# Patient Record
Sex: FEMALE | Race: WHITE | Employment: UNEMPLOYED | URBAN - METROPOLITAN AREA
[De-identification: names, ages, dates, MRNs, and addresses within clinical notes are randomized per-mention and may not be internally consistent; named-entity substitution may affect disease eponyms.]

---

## 2019-10-15 NOTE — LETTER
[facility]  Administration of Anesthesia  I, Carisa Khan agree to be cared for by a physician anesthesiologist alone and/or with a nurse anesthetist, who is specially trained to monitor me and give me medicine to put me to sleep or keep me comfortable during my procedure    I understand that my anesthesiologist and/or anesthetist is not an employee or agent of [facility] or [employer]. He or she works for [facility] [employer]    As the patient asking for anesthesia services, I agree to:  Allow the anesthesiologist (anesthesia doctor) to give me medicine and do additional procedures as necessary. Some examples are: Starting or using an “IV” to give me medicine, fluids or blood during my procedure, and having a breathing tube placed to help me breathe when I’m asleep (intubation). In the event that my heart stops working properly, I understand that my anesthesiologist will make every effort to sustain my life, unless otherwise directed by [facility] Do Not Resuscitate documents.  Tell my anesthesia doctor before my procedure:  If I am pregnant.  The last time that I ate or drank.  iii. All of the medicines I take (including prescriptions, herbal supplements, and pills I can buy without a prescription (including street drugs/illegal medications). Failure to inform my anesthesiologist about these medicines may increase my risk of anesthetic complications.  iv.If I am allergic to anything or have had a reaction to anesthesia before.  I understand how the anesthesia medicine will help me (benefits).  I understand that with any type of anesthesia medicine there are risks:  The most common risks are: nausea, vomiting, sore throat, muscle soreness, damage to my eyes, mouth, or teeth (from breathing tube placement).  Rare risks include: remembering what happened during my procedure, allergic reactions to medications, injury to my airway, heart, lungs, vision, nerves, or  muscles and in extremely rare instances death.  My doctor has explained to me other choices available to me for my care (alternatives).  Pregnant Patients (“epidural”):  I understand that the risks of having an epidural (medicine given into my back to help control pain during labor), include itching, low blood pressure, difficulty urinating, headache or slowing of the baby’s heart. Very rare risks include infection, bleeding, seizure, irregular heart rhythms and nerve injury.  Regional Anesthesia (“spinal”, “epidural”, & “nerve blocks”):  I understand that rare but potential complications include headache, bleeding, infection, seizure, irregular heart rhythms, and nerve injury.    _____________________________________________________________________________  Patient (or Representative) Signature/Relationship to Patient  Date   Time    _____________________________________________________________________________   Name (if used)    Language/Organization   Time    _____________________________________________________________________________  Nurse Anesthetist Signature     Date   Time  _____________________________________________________________________________  Anesthesiologist Signature     Date   Time  I have discussed the procedure and information above with the patient (or patient’s representative) and answered their questions. The patient or their representative has agreed to have anesthesia services.    _____________________________________________________________________________  Witness        Date   Time  I have verified that the signature is that of the patient or patient’s representative, and that it was signed before the procedure  Patient Name: Carisa Khan     : 1995                 Printed: 2024 at 7:21 AM    Medical Record #: [id number]                                            Page 1 of 1  ----------ANESTHESIA CONSENT----------     [___] : Multiple Births: [unfilled]

## 2024-06-15 ENCOUNTER — HOSPITAL ENCOUNTER (EMERGENCY)
Facility: HOSPITAL | Age: 29
Discharge: HOME | End: 2024-06-16

## 2024-06-15 DIAGNOSIS — K62.5 BRBPR (BRIGHT RED BLOOD PER RECTUM): ICD-10-CM

## 2024-06-15 DIAGNOSIS — S30.1XXA CONTUSION OF ABDOMINAL WALL, INITIAL ENCOUNTER: Primary | ICD-10-CM

## 2024-06-15 LAB
ALBUMIN SERPL-MCNC: 4.7 G/DL (ref 3.2–4.8)
ALBUMIN/GLOB SERPL: 1.4 {RATIO} (ref 1–2)
ALP LIVER SERPL-CCNC: 98 U/L
ALT SERPL-CCNC: 134 U/L
ANION GAP SERPL CALC-SCNC: 10 MMOL/L (ref 0–18)
AST SERPL-CCNC: 65 U/L (ref ?–34)
BASOPHILS # BLD AUTO: 0.05 X10(3) UL (ref 0–0.2)
BASOPHILS NFR BLD AUTO: 0.5 %
BILIRUB SERPL-MCNC: 0.3 MG/DL (ref 0.3–1.2)
BUN BLD-MCNC: 12 MG/DL (ref 9–23)
BUN/CREAT SERPL: 14 (ref 10–20)
CALCIUM BLD-MCNC: 9.7 MG/DL (ref 8.7–10.4)
CHLORIDE SERPL-SCNC: 108 MMOL/L (ref 98–112)
CO2 SERPL-SCNC: 23 MMOL/L (ref 21–32)
CREAT BLD-MCNC: 0.86 MG/DL
DEPRECATED RDW RBC AUTO: 42.9 FL (ref 35.1–46.3)
EGFRCR SERPLBLD CKD-EPI 2021: 94 ML/MIN/1.73M2 (ref 60–?)
EOSINOPHIL # BLD AUTO: 0.41 X10(3) UL (ref 0–0.7)
EOSINOPHIL NFR BLD AUTO: 3.7 %
ERYTHROCYTE [DISTWIDTH] IN BLOOD BY AUTOMATED COUNT: 12.8 % (ref 11–15)
GLOBULIN PLAS-MCNC: 3.3 G/DL (ref 2–3.5)
GLUCOSE BLD-MCNC: 121 MG/DL (ref 70–99)
HCT VFR BLD AUTO: 41.5 %
HGB BLD-MCNC: 14.2 G/DL
IMM GRANULOCYTES # BLD AUTO: 0.05 X10(3) UL (ref 0–1)
IMM GRANULOCYTES NFR BLD: 0.5 %
LYMPHOCYTES # BLD AUTO: 3.47 X10(3) UL (ref 1–4)
LYMPHOCYTES NFR BLD AUTO: 31.5 %
MCH RBC QN AUTO: 31.4 PG (ref 26–34)
MCHC RBC AUTO-ENTMCNC: 34.2 G/DL (ref 31–37)
MCV RBC AUTO: 91.8 FL
MONOCYTES # BLD AUTO: 0.59 X10(3) UL (ref 0.1–1)
MONOCYTES NFR BLD AUTO: 5.3 %
NEUTROPHILS # BLD AUTO: 6.46 X10 (3) UL (ref 1.5–7.7)
NEUTROPHILS # BLD AUTO: 6.46 X10(3) UL (ref 1.5–7.7)
NEUTROPHILS NFR BLD AUTO: 58.5 %
OSMOLALITY SERPL CALC.SUM OF ELEC: 293 MOSM/KG (ref 275–295)
PLATELET # BLD AUTO: 394 10(3)UL (ref 150–450)
POTASSIUM SERPL-SCNC: 3.9 MMOL/L (ref 3.5–5.1)
PROT SERPL-MCNC: 8 G/DL (ref 5.7–8.2)
RBC # BLD AUTO: 4.52 X10(6)UL
SODIUM SERPL-SCNC: 141 MMOL/L (ref 136–145)
WBC # BLD AUTO: 11 X10(3) UL (ref 4–11)

## 2024-06-15 PROCEDURE — 85025 COMPLETE CBC W/AUTO DIFF WBC: CPT

## 2024-06-15 PROCEDURE — 99284 EMERGENCY DEPT VISIT MOD MDM: CPT

## 2024-06-15 PROCEDURE — 85610 PROTHROMBIN TIME: CPT | Performed by: NURSE PRACTITIONER

## 2024-06-15 PROCEDURE — 80053 COMPREHEN METABOLIC PANEL: CPT

## 2024-06-15 PROCEDURE — 36415 COLL VENOUS BLD VENIPUNCTURE: CPT

## 2024-06-16 ENCOUNTER — APPOINTMENT (OUTPATIENT)
Dept: CT IMAGING | Facility: HOSPITAL | Age: 29
End: 2024-06-16
Attending: NURSE PRACTITIONER

## 2024-06-16 VITALS
OXYGEN SATURATION: 96 % | RESPIRATION RATE: 18 BRPM | WEIGHT: 148 LBS | DIASTOLIC BLOOD PRESSURE: 79 MMHG | SYSTOLIC BLOOD PRESSURE: 139 MMHG | HEIGHT: 64 IN | TEMPERATURE: 98 F | BODY MASS INDEX: 25.27 KG/M2 | HEART RATE: 76 BPM

## 2024-06-16 LAB
B-HCG UR QL: NEGATIVE
B-HCG UR QL: NEGATIVE
INR BLD: 0.93 (ref 0.8–1.2)
PROTHROMBIN TIME: 13 SECONDS (ref 11.6–14.8)

## 2024-06-16 PROCEDURE — 74177 CT ABD & PELVIS W/CONTRAST: CPT | Performed by: NURSE PRACTITIONER

## 2024-06-16 PROCEDURE — 81025 URINE PREGNANCY TEST: CPT

## 2024-06-16 RX ORDER — PANTOPRAZOLE SODIUM 20 MG/1
20 TABLET, DELAYED RELEASE ORAL DAILY
Qty: 30 TABLET | Refills: 0 | Status: SHIPPED | OUTPATIENT
Start: 2024-06-16 | End: 2024-07-16
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
## 2024-06-16 NOTE — ED QUICK NOTES
PT to ED with spouse, reporting melena with clots and bright red blood since 1500 this afternoon. Denies trauma, denies blood thinners.

## 2024-06-16 NOTE — ED QUICK NOTES
AVS reviewed with pt, spouse, and mother, all verbalized understanding. PT did not have melena or bm during ED stay. No signs of distress observed or reported at time of discharge.

## 2024-06-17 ENCOUNTER — TELEPHONE (OUTPATIENT)
Facility: CLINIC | Age: 29
End: 2024-06-17

## 2024-06-17 NOTE — TELEPHONE ENCOUNTER
Patient called regarding making a hospital follow up appointment with Dr. Ray. States she was advised to make an appointment with Dr. Ray two days after her visit to the ER. Patient experiencing blood in stool and severe abdominal pain. Please advise.

## 2024-06-17 NOTE — TELEPHONE ENCOUNTER
Spoke to patient    Patient was recently in ER for BRBPR  Scheduled her in our discharge clinic     Location, date and time confirmed    Your Appointments      Friday June 21, 2024 11:30 AM  Consult with Disha Mathis PA-C  [facility], [facility] [address] [id number]  [phone]

## 2024-06-21 ENCOUNTER — TELEPHONE (OUTPATIENT)
Facility: CLINIC | Age: 29
End: 2024-06-21

## 2024-06-21 ENCOUNTER — OFFICE VISIT (OUTPATIENT)
Facility: CLINIC | Age: 29
End: 2024-06-21

## 2024-06-21 VITALS
WEIGHT: 184 LBS | SYSTOLIC BLOOD PRESSURE: 125 MMHG | HEIGHT: 64 IN | BODY MASS INDEX: 31.41 KG/M2 | HEART RATE: 76 BPM | DIASTOLIC BLOOD PRESSURE: 87 MMHG

## 2024-06-21 DIAGNOSIS — R11.0 NAUSEA: ICD-10-CM

## 2024-06-21 DIAGNOSIS — K62.5 RECTAL BLEEDING: Primary | ICD-10-CM

## 2024-06-21 DIAGNOSIS — R14.0 BLOATING: ICD-10-CM

## 2024-06-21 DIAGNOSIS — K59.00 CONSTIPATION, UNSPECIFIED CONSTIPATION TYPE: ICD-10-CM

## 2024-06-21 PROCEDURE — 99204 OFFICE O/P NEW MOD 45 MIN: CPT | Performed by: PHYSICIAN ASSISTANT

## 2024-06-21 RX ORDER — SODIUM, POTASSIUM,MAG SULFATES 17.5-3.13G
SOLUTION, RECONSTITUTED, ORAL ORAL
Qty: 1 EACH | Refills: 0 | Status: SHIPPED | OUTPATIENT
Start: 2024-06-21

## 2024-06-21 NOTE — TELEPHONE ENCOUNTER
Patient contacted.  She is ok paying the price with the goodrx[employer]com coupon.  I called [facility] and applied the coupon.

## 2024-06-21 NOTE — PATIENT INSTRUCTIONS
Recommendations:  - call to schedule ultrasound   -Start Miralax 17g per day, titrate up or down for ideal frequency  -Increase water intake to 64oz of water per day  -Increase fiber to 20-30 g per day with fruits, vegetables and whole grains  -Increase exercise to 150 mins/week  -Do not ignore urge to defecate  -Avoid artifical sugars  -If bowel habits change or constipation worsens, call our office sooner   -follow up with Disha after colonoscopy       1. Schedule colonoscopy with next available provider with [facility] [Diagnosis: rectal bleeding]    2.  bowel prep from pharmacy ([employer] suprep)  -Buy over the counter dulcolax laxative, and take one tablet daily for 3 days prior to drinking the bowel prep.      3. Continue all medications as normal for your procedure.    4. Read all bowel prep instructions carefully. Bowel prep instructions can also be found online at:  www.[facility].org/giprep     5. AVOID seeds, nuts, popcorn, raw fruits and vegetables for 3 days before procedure    6. You MAY need to go for COVID testing 72 hours before procedure. The testing team will call you a few days before your procedure to discuss with you if testing is required. If you are asked to go for COVID testing and do not completed the test, the procedure cannot be performed.     7. If you start any NEW medication after your visit today, please notify us. Certain medications (like iron or weight loss medications) will need to be held before the procedure, or the procedure cannot be performed safely.

## 2024-06-21 NOTE — TELEPHONE ENCOUNTER
Scheduled for: Colonoscopy [id number]  Provider Name:    Date:  9/17/2024  Location:  [facility]  Sedation:  [facility]  Time:  2:00 pm, (pt is aware to arrive at 1:00 pm)  Prep: Suprep   Meds/Allergies Reconciled?: Disha/ PA reviewed.   Diagnosis with codes: Rectal Bleeding K62.5   Was patient informed to call insurance with codes (Y/N): Yes    Referral sent?:  Referral was sent at the time of electronic surgical scheduling.  EM or [facility] notified?:   I sent an electronic request to Endo Scheduling and received a confirmation today.  Medication Orders: N/A   Misc Orders:  N/A     Further instructions given by staff:I discussed the prep instructions with the patient which she verbally understood. Provided patient with prep instructions and cancellation policy at the time of office visit.

## 2024-06-21 NOTE — H&P
[facility] - Gastroenterology                                                                                                               Reason for consult:   Chief Complaint   Patient presents with    Blood In Stool    Rectal Bleeding       Requesting physician or provider: No primary care provider on file.      HPI:   Carisa Khan is a 29 year old year-old female with no pertinent medical history who presents to clinic for ER follow up. Patient seen in ER on 6/15/24 for bloody stool. She notes had 8 episodes of bright red blood in stool after falling off a wake board. She had no fevers or chills. No vomiting. In ER, CT a/p without any acute findings. Gallbladder was contracted. Labs demonstrated mild elevation in LFTs no anemia. Patient presents today for follow up.     Constipation- She notes history of chronic constipation. Has always had skinny small bowel movements.   She notes can have 2-3 BM per day, but then can go 3-4 days without a bowel movement.   Has noted bright red blood in stool prior. She notes on a regular basis can have lower abdominal cramping.   No recent bright red blood per rectum since discharge from ER. Does drink a lot of water on a regular basis. Does incorporate fiber on a regular basis. Moving around on a regular basis.     Patient does note bloating on a daily basis especially after meals. Bloating only improves with time. Denies epigastric pain. No nausea or vomiting. Not currently  on any elimination diet.       NSAIDS: PRN  Tobacco: none  Alcohol: rare  Marijuana: none  Illicit drugs: none    FH GI malignancy- none  FH celiac dz- none  FH liver dz- none  FH IBD- none    No history of adverse reaction to sedation  No ARTURO  No anticoagulants  No pacemaker/defibrillator  No pain medications and/or sleep aides      Last colonoscopy: none  Last EGD: none    Wt Readings from Last 6 Encounters:   06/21/24 184 lb (83.5 kg)   06/15/24  148 lb (67.1 kg)        History, Medications, Allergies, ROS:      History reviewed. No pertinent past medical history.   History reviewed. No pertinent surgical history.   Family Hx: History reviewed. No pertinent family history.   Social History:   Social History     Socioeconomic History    Marital status:    Tobacco Use    Smoking status: Never    Smokeless tobacco: Never   Vaping Use    Vaping status: Never Used   Substance and Sexual Activity    Alcohol use: Never    Drug use: Never        Medications (Active prior to today's visit):  Current Outpatient Medications   Medication Sig Dispense Refill    Na Sulfate-K Sulfate-Mg Sulf (SUPREP BOWEL PREP KIT) 17.5-3.13-1.6 GM/177ML Oral Solution Take prep as directed by gastro office. May substitute with Trilyte/generic equivalent if needed. 1 each 0    pantoprazole 20 MG Oral Tab EC Take 1 tablet (20 mg total) by mouth daily. (Patient not taking: Reported on 6/21/2024) 30 tablet 0       Allergies:  Allergies   Allergen Reactions    Bananas ITCHING       ROS:   CONSTITUTIONAL: negative for fevers, chills, sweats and weight loss  EYES Negative for red eyes, yellow eyes, changes in vision  HEENT: Negative for dysphagia and hoarseness  RESPIRATORY: Negative for cough and shortness of breath  CARDIOVASCULAR: Negative for chest pain, palpitations  GASTROINTESTINAL: See HPI  GENITOURINARY: Negative for dysuria and frequency  MUSCULOSKELETAL: Negative for arthralgias and myalgias  NEUROLOGICAL: Negative for dizziness and headaches  BEHAVIOR/PSYCH: Negative for anxiety and poor appetite    PHYSICAL EXAM:   Blood pressure 125/87, pulse 76, height 5' 4\" (1.626 m), weight 184 lb (83.5 kg), last menstrual period 05/10/2024.    GEN: WD/WN, NAD  HEENT: Supple symmetrical, trachea midline  CV: RRR, the extremities are warm and well perfused   LUNGS: No increased work of breathing  ABDOMEN: No scars, normal bowel sounds, soft, non-tender, non-distended no rebound or guarding,  no masses, no hepatomegaly  MSK: No redness, no warmth, no swelling of joints  SKIN: No jaundice, no erythema, no rashes  HEMATOLOGIC: No bleeding, no bruising  NEURO: Alert and interactive, normal gait    Labs/Imaging/Procedures:     Patient's pertinent labs and imaging were reviewed and discussed with patient today.     Lab Results   Component Value Date    WBC 11.0 06/15/2024    RBC 4.52 06/15/2024    HGB 14.2 06/15/2024    HCT 41.5 06/15/2024    MCV 91.8 06/15/2024    MCH 31.4 06/15/2024    MCHC 34.2 06/15/2024    RDW 12.8 06/15/2024    .0 06/15/2024        Lab Results   Component Value Date     (H) 06/15/2024    BUN 12 06/15/2024    BUNCREA 14.0 06/15/2024    CREATSERUM 0.86 06/15/2024    ANIONGAP 10 06/15/2024    CA 9.7 06/15/2024    OSMOCALC 293 06/15/2024    ALKPHO 98 06/15/2024    AST 65 (H) 06/15/2024     (H) 06/15/2024    BILT 0.3 06/15/2024    TP 8.0 06/15/2024    ALB 4.7 06/15/2024    GLOBULIN 3.3 06/15/2024     06/15/2024    K 3.9 06/15/2024     06/15/2024    CO2 23.0 06/15/2024        CT ABDOMEN+PELVIS(CONTRAST ONLY)(CPT=74177)    Result Date: 6/16/2024  PROCEDURE: CT ABDOMEN + PELVIS (CONTRAST ONLY) (CPT=74177)  COMPARISON: None.  INDICATIONS: Bloody Stool  TECHNIQUE: CT images of the abdomen and pelvis were obtained with non-ionic intravenous contrast material.  Automated exposure control for dose reduction was used. Adjustment of the mA and/or kV was done based on the patient's size. Use of iterative reconstruction technique for dose reduction was used.  Dose information is transmitted to the ACR (American College of Radiology) NRDR (National Radiology Data Registry) which includes the Dose Index Registry.  FINDINGS:  LIVER: Diffuse low attenuation seen throughout the liver compatible with fatty infiltration. GALLBLADDER: Contracted. BILIARY: No intra-or extrahepatic biliary ductal dilation. SPLEEN: Unremarkable PANCREAS: The pancreas enhances symmetrically. No  ductal dilation. ADRENALS: Unremarkable KIDNEYS: The kidneys enhance symmetrically. There is no hydronephrosis.  AORTA/VASCULAR:   Normal.  No aneurysm or dissection.  RETROPERITONEUM: No mass or enlarged adenopathy.  BOWEL:  No dilation or wall thickening. The appendix is normal. There are no inflammatory changes within the right lower quadrant.  MESENTERY: Normal.  No mass or hernia.   PELVIS: No enlarged mass or adenopathy. No bladder wall thickening. BONES:   No significant bony lesion or fracture. LUNG BASES: There is bibasilar and lingular atelectasis and scarring.         CONCLUSION:   No acute process within the abdomen or pelvis.  Hepatic steatosis.  Preliminary report was submitted by the [employer] teleradiologist and there are no significant discrepancies.     Dictated by (CST): Hakeem Hart MD on 6/16/2024 at 9:27 AM     Finalized by (CST): Hakeem Hart MD on 6/16/2024 at 9:29 AM                  .  ASSESSMENT/PLAN:   Carisa Khan is a 29 year old year-old female with no pertinent medical history who presents to clinic for ER follow up. Patient seen in ER on 6/15/24 for bloody stool. She notes had 8 episodes of bright red blood in stool after falling off a wake board. She had no fevers or chills. No vomiting. In ER, CT a/p without any acute findings. Gallbladder was contracted. Labs demonstrated mild elevation in LFTs no anemia. Patient presents today for follow up.     #constipation   #rectal bleeding  -episodes of rectal bleeding without drop in hemoglobin   -hx of constipation, often straining with BM  -etiology most likely hemorrhoidal however cannot rule out other malignant cause   -discussed bowel regiment and hemorrhoid treatment  -plan for colonoscopy at this time    #bloating  #elevated LFTS  -patient with bloating specifically after meals  -mild elevation in LFTs on 6/15, hepatic steatosis noted on CT  -concern for biliary cause for elevation in LFTs  -plan for ultrasound at this time and  will discuss from there repeating LFTs      Recommendations:  - call to schedule ultrasound   -Start Miralax 17g per day, titrate up or down for ideal frequency  -Increase water intake to 64oz of water per day  -Increase fiber to 20-30 g per day with fruits, vegetables and whole grains  -Increase exercise to 150 mins/week  -Do not ignore urge to defecate  -Avoid artifical sugars  -If bowel habits change or constipation worsens, call our office sooner   -follow up with Disha after colonoscopy       1. Schedule colonoscopy with next available provider with [facility] [Diagnosis: rectal bleeding]    2.  bowel prep from pharmacy (split suprep)  -Buy over the counter dulcolax laxative, and take one tablet daily for 3 days prior to drinking the bowel prep.      3. Continue all medications as normal for your procedure.    4. Read all bowel prep instructions carefully. Bowel prep instructions can also be found online at:  www.[facility].org/giprep     5. AVOID seeds, nuts, popcorn, raw fruits and vegetables for 3 days before procedure    6. You MAY need to go for COVID testing 72 hours before procedure. The testing team will call you a few days before your procedure to discuss with you if testing is required. If you are asked to go for COVID testing and do not completed the test, the procedure cannot be performed.     7. If you start any NEW medication after your visit today, please notify us. Certain medications (like iron or weight loss medications) will need to be held before the procedure, or the procedure cannot be performed safely.        Orders This Visit:  No orders of the defined types were placed in this encounter.      Meds This Visit:  Requested Prescriptions     Signed Prescriptions Disp Refills    Na Sulfate-K Sulfate-Mg Sulf (SUPREP BOWEL PREP KIT) 17.5-3.13-1.6 GM/177ML Oral Solution 1 each 0     Sig: Take prep as directed by gastro office. May substitute with Trilyte/generic equivalent if needed.        Imaging & Referrals:  None    ENDOSCOPIC RISK BENEFIT DISCUSSION: I described the procedure in great detail with the patient. I discussed the risks and benefits, including but not limited to: bleeding, perforation, infection, anesthesia complications, and even death. Patient will be NPO after midnight and will have a person physically present at time of pick-up to drive patient home. Patient verbalized understanding and agrees to proceed with procedure as planned.      Disha Mathis PA-C   6/21/2024        This note was partially prepared using Dragon Medical voice recognition dictation software. As a result, errors may occur. When identified, these errors have been corrected. While every attempt is made to correct errors during dictation, discrepancies may still exist.

## 2024-06-21 NOTE — TELEPHONE ENCOUNTER
Current Outpatient Medications   Medication Sig Dispense Refill    Na Sulfate-K Sulfate-Mg Sulf (SUPREP BOWEL PREP KIT) 17.5-3.13-1.6 GM/177ML Oral Solution Take prep as directed by gastro office. May substitute with Trilyte/generic equivalent if needed. 1 each 0       Brand and generic not covered.

## 2024-06-27 ENCOUNTER — HOSPITAL ENCOUNTER (OUTPATIENT)
Dept: ULTRASOUND IMAGING | Age: 29
Discharge: HOME | End: 2024-06-27
Attending: PHYSICIAN ASSISTANT

## 2024-06-27 DIAGNOSIS — R14.0 BLOATING: ICD-10-CM

## 2024-06-27 DIAGNOSIS — R11.0 NAUSEA: ICD-10-CM

## 2024-06-27 PROCEDURE — 76700 US EXAM ABDOM COMPLETE: CPT | Performed by: PHYSICIAN ASSISTANT

## 2024-06-28 ENCOUNTER — TELEPHONE (OUTPATIENT)
Facility: CLINIC | Age: 29
End: 2024-06-28

## 2024-06-28 NOTE — TELEPHONE ENCOUNTER
Called and left message for patient to review ultrasound.   Advised to call back.       Nursing: If patient calls back, please let her know there was nothing of concern on her ultrasound and I can call her to discuss ultrasound and next steps when I return on 7/9.     Disha Mathis PA-C

## 2024-07-09 RX ORDER — PANTOPRAZOLE SODIUM 40 MG/1
40 TABLET, DELAYED RELEASE ORAL
Qty: 30 TABLET | Refills: 0 | Status: SHIPPED | OUTPATIENT
Start: 2024-07-09 | End: 2024-08-08

## 2024-07-09 NOTE — TELEPHONE ENCOUNTER
Patient contacted and f/u appointment made with Disha Mathis on 08/09/2024 at 1:00 pm at [facility].  Patient advised to arrive 15 minutes prior to appointment and address given.  Patient voiced understanding.

## 2024-07-09 NOTE — TELEPHONE ENCOUNTER
Reviewed ultrasound findings with patient.     Patient has had some improvements in BM with use of miralax. Having more daily BM. Can still have episodes of straining. No return of bright red blood per rectum. She continues to have bloating on a regular basis. No gallstones on ultrasound. Will plan to trial PPI at this time and then follow up in 1 month. Can consider EGD with colonoscopy.     Patient acknowledged understanding and all questions answered.     Disha Mathis PA-C         Nursing: Please set up follow up with myself in 1 month.     Disha Mathis PA-C

## 2024-08-07 RX ORDER — PANTOPRAZOLE SODIUM 40 MG/1
40 TABLET, DELAYED RELEASE ORAL
Qty: 30 TABLET | Refills: 0 | Status: SHIPPED | OUTPATIENT
Start: 2024-08-07

## 2024-08-07 NOTE — TELEPHONE ENCOUNTER
Requested Prescriptions     Pending Prescriptions Disp Refills    PANTOPRAZOLE 40 MG Oral Tab EC [Pharmacy Med Name: PANTOPRAZOLE SOD DR 40 MG TAB] 30 tablet 0     Sig: TAKE 1 TABLET BY MOUTH EVERY DAY IN THE MORNING BEFORE BREAKFAST         LOV   6/21/2024      LR     7/9/2024      [place]      RR to office on call.

## 2024-08-09 ENCOUNTER — OFFICE VISIT (OUTPATIENT)
Facility: CLINIC | Age: 29
End: 2024-08-09
Payer: MEDICAID

## 2024-08-09 ENCOUNTER — LAB ENCOUNTER (OUTPATIENT)
Dept: LAB | Facility: HOSPITAL | Age: 29
End: 2024-08-09
Attending: PHYSICIAN ASSISTANT
Payer: MEDICAID

## 2024-08-09 VITALS
BODY MASS INDEX: 31.92 KG/M2 | SYSTOLIC BLOOD PRESSURE: 128 MMHG | HEIGHT: 64 IN | WEIGHT: 187 LBS | HEART RATE: 89 BPM | DIASTOLIC BLOOD PRESSURE: 85 MMHG

## 2024-08-09 DIAGNOSIS — K62.5 RECTAL BLEEDING: ICD-10-CM

## 2024-08-09 DIAGNOSIS — K62.5 RECTAL BLEEDING: Primary | ICD-10-CM

## 2024-08-09 DIAGNOSIS — K59.00 CONSTIPATION, UNSPECIFIED CONSTIPATION TYPE: ICD-10-CM

## 2024-08-09 DIAGNOSIS — R14.0 BLOATING: ICD-10-CM

## 2024-08-09 LAB
BASOPHILS # BLD AUTO: 0.06 X10(3) UL (ref 0–0.2)
BASOPHILS NFR BLD AUTO: 0.7 %
DEPRECATED RDW RBC AUTO: 41 FL (ref 35.1–46.3)
EOSINOPHIL # BLD AUTO: 0.43 X10(3) UL (ref 0–0.7)
EOSINOPHIL NFR BLD AUTO: 5 %
ERYTHROCYTE [DISTWIDTH] IN BLOOD BY AUTOMATED COUNT: 12.4 % (ref 11–15)
HCT VFR BLD AUTO: 42.1 %
HGB BLD-MCNC: 14.5 G/DL
IMM GRANULOCYTES # BLD AUTO: 0.05 X10(3) UL (ref 0–1)
IMM GRANULOCYTES NFR BLD: 0.6 %
LYMPHOCYTES # BLD AUTO: 2.81 X10(3) UL (ref 1–4)
LYMPHOCYTES NFR BLD AUTO: 32.9 %
MCH RBC QN AUTO: 31.3 PG (ref 26–34)
MCHC RBC AUTO-ENTMCNC: 34.4 G/DL (ref 31–37)
MCV RBC AUTO: 90.7 FL
MONOCYTES # BLD AUTO: 0.54 X10(3) UL (ref 0.1–1)
MONOCYTES NFR BLD AUTO: 6.3 %
NEUTROPHILS # BLD AUTO: 4.64 X10 (3) UL (ref 1.5–7.7)
NEUTROPHILS # BLD AUTO: 4.64 X10(3) UL (ref 1.5–7.7)
NEUTROPHILS NFR BLD AUTO: 54.5 %
PLATELET # BLD AUTO: 340 10(3)UL (ref 150–450)
RBC # BLD AUTO: 4.64 X10(6)UL
WBC # BLD AUTO: 8.5 X10(3) UL (ref 4–11)

## 2024-08-09 PROCEDURE — 85025 COMPLETE CBC W/AUTO DIFF WBC: CPT

## 2024-08-09 PROCEDURE — 99214 OFFICE O/P EST MOD 30 MIN: CPT | Performed by: PHYSICIAN ASSISTANT

## 2024-08-09 PROCEDURE — 36415 COLL VENOUS BLD VENIPUNCTURE: CPT

## 2024-08-09 NOTE — PROGRESS NOTES
[facility] - Gastroenterology                                                                                                               Reason for consult:   Chief Complaint   Patient presents with    Follow - Up       Requesting physician or provider: No primary care provider on file.      HPI:   Carisa Khan is a 29 year old year-old female with no pertinent medical history who presents to clinic for ER follow up. Patient seen in ER on 6/15/24 for bloody stool. She notes had 8 episodes of bright red blood in stool after falling off a wake board. She had no fevers or chills. No vomiting. In ER, CT a/p without any acute findings. Gallbladder was contracted. Labs demonstrated mild elevation in LFTs no anemia. Seen in June and had ultrasound ordered. Gallbladder was within normal. Presents today for follow up.      Constipation- She notes history of chronic constipation. Has been taking miralax BID. Having daily BM large and formed. Denies bright red blood per rectum and melena.     Does drink a lot of water on a regular basis. Does incorporate fiber on a regular basis. Moving around on a regular basis.     Another episode of brbpr.   Miralax has been helping with BM. Having     Bloating- Did trial of Protonix for 2 weeks and had worsening bloating and swelling. Gained weight with medication. She stopped medication because she felt bad. Denies epigastric pain. No nausea or vomiting. Not currently  on any elimination diet.         NSAIDS: PRN  Tobacco: none  Alcohol: rare  Marijuana: none  Illicit drugs: none     FH GI malignancy- none  FH celiac dz- none  FH liver dz- none  FH IBD- none     No history of adverse reaction to sedation  No ARTURO  No anticoagulants  No pacemaker/defibrillator  No pain medications and/or sleep aides        Last colonoscopy: none  Last EGD: none     Wt Readings from Last 6 Encounters:   08/09/24 187 lb (84.8 kg)   06/21/24 184 lb (83.5  kg)   06/15/24 148 lb (67.1 kg)        History, Medications, Allergies, ROS:      No past medical history on file.   No past surgical history on file.   Family Hx: No family history on file.   Social History:   Social History     Socioeconomic History    Marital status:    Tobacco Use    Smoking status: Never    Smokeless tobacco: Never   Vaping Use    Vaping status: Never Used   Substance and Sexual Activity    Alcohol use: Never    Drug use: Never        Medications (Active prior to today's visit):  Current Outpatient Medications   Medication Sig Dispense Refill    PANTOPRAZOLE 40 MG Oral Tab EC TAKE 1 TABLET BY MOUTH EVERY DAY IN THE MORNING BEFORE BREAKFAST 30 tablet 0    Na Sulfate-K Sulfate-Mg Sulf (SUPREP BOWEL PREP KIT) 17.5-3.13-1.6 GM/177ML Oral Solution Take prep as directed by gastro office. May substitute with Trilyte/generic equivalent if needed. 1 each 0       Allergies:  Allergies   Allergen Reactions    Bananas ITCHING       ROS:   CONSTITUTIONAL: negative for fevers, chills, sweats and weight loss  EYES Negative for red eyes, yellow eyes, changes in vision  HEENT: Negative for dysphagia and hoarseness  RESPIRATORY: Negative for cough and shortness of breath  CARDIOVASCULAR: Negative for chest pain, palpitations  GASTROINTESTINAL: See HPI  GENITOURINARY: Negative for dysuria and frequency  MUSCULOSKELETAL: Negative for arthralgias and myalgias  NEUROLOGICAL: Negative for dizziness and headaches  BEHAVIOR/PSYCH: Negative for anxiety and poor appetite    PHYSICAL EXAM:   Blood pressure 128/85, pulse 89, height 5' 4\" (1.626 m), weight 187 lb (84.8 kg), last menstrual period 05/10/2024.    GEN: WD/WN, NAD  HEENT: Supple symmetrical, trachea midline  CV: RRR, the extremities are warm and well perfused   LUNGS: No increased work of breathing  ABDOMEN: No scars, normal bowel sounds, soft, non-tender, non-distended no rebound or guarding, no masses, no hepatomegaly  MSK: No redness, no warmth, no  swelling of joints  SKIN: No jaundice, no erythema, no rashes  HEMATOLOGIC: No bleeding, no bruising  NEURO: Alert and interactive, normal gait    Labs/Imaging/Procedures:     Patient's pertinent labs and imaging were reviewed and discussed with patient today.     Lab Results   Component Value Date    WBC 11.0 06/15/2024    RBC 4.52 06/15/2024    HGB 14.2 06/15/2024    HCT 41.5 06/15/2024    MCV 91.8 06/15/2024    MCH 31.4 06/15/2024    MCHC 34.2 06/15/2024    RDW 12.8 06/15/2024    .0 06/15/2024        Lab Results   Component Value Date     (H) 06/15/2024    BUN 12 06/15/2024    BUNCREA 14.0 06/15/2024    CREATSERUM 0.86 06/15/2024    ANIONGAP 10 06/15/2024    CA 9.7 06/15/2024    OSMOCALC 293 06/15/2024    ALKPHO 98 06/15/2024    AST 65 (H) 06/15/2024     (H) 06/15/2024    BILT 0.3 06/15/2024    TP 8.0 06/15/2024    ALB 4.7 06/15/2024    GLOBULIN 3.3 06/15/2024     06/15/2024    K 3.9 06/15/2024     06/15/2024    CO2 23.0 06/15/2024        No results found.          .  ASSESSMENT/PLAN:   Carisa Khan is a 29 year old year-old female with no pertinent medical history who presents to clinic for ER follow up. Patient seen in ER on 6/15/24 for bloody stool. She notes had 8 episodes of bright red blood in stool after falling off a wake board. She had no fevers or chills. No vomiting. In ER, CT a/p without any acute findings. Gallbladder was contracted. Labs demonstrated mild elevation in LFTs no anemia. Seen in June and had ultrasound ordered. Gallbladder was within normal. Presents today for follow up.     #constipation  #rectal bleeding  -now having daily BM with use of miralax  -will keep planned colonoscopy at this time to rule out alternative cause for bleeding other than hemorrhoids  -continue miralax at this time    #bloating  -negative gall bladder ultrasound, no improvement with PPI   -plan to trial FD ramirez and discussed functional dyspepsia with patient   -plan to add  EGD to colonoscopy in September  -patient agreeable to plan    EGD consent: I have discussed the risks, benefits, and alternatives to upper endoscopy/enteroscopy with the patient/primary decision maker [who demonstrated understanding], including but not limited to the risks of bleeding, infection, pain, death, as well as the risks of anesthesia and perforation all leading to prolonged hospitalization, surgical intervention, or even death. I also specifically mentioned the miss rate of upper endoscopy of 5-10% in the best of all circumstances.  The patient has agreed to sign an informed consent and elected to proceed with procedure with possible intervention [i.e. polypectomy, stent placement, etc.] as indicated.    Recommendations:  - trial of FD ramirez, can up over the counter  - send a my chart in next 2-4 weeks with symptom update  -add EGD to colonoscopy   Dx: bloating  Follow the instructions you have for colonoscopy      Orders This Visit:  Orders Placed This Encounter   Procedures    CBC W Differential W Platelet       Meds This Visit:  Requested Prescriptions      No prescriptions requested or ordered in this encounter       Imaging & Referrals:  None      Disha Mathis PA-C   8/9/2024        This note was partially prepared using Dragon Medical voice recognition dictation software. As a result, errors may occur. When identified, these errors have been corrected. While every attempt is made to correct errors during dictation, discrepancies may still exist.

## 2024-08-09 NOTE — PATIENT INSTRUCTIONS
Recommendations:  - trial of FD ramirez, can up over the counter  - send a my chart in next 2-4 weeks with symptom update  -add EGD to colonoscopy   Dx: bloating  Follow the instructions you have for colonoscopy        Primary care:   Elle Dykes Md

## 2024-09-17 ENCOUNTER — ANESTHESIA EVENT (OUTPATIENT)
Dept: ENDOSCOPY | Age: 29
End: 2024-09-17
Payer: MEDICAID

## 2024-09-17 ENCOUNTER — HOSPITAL ENCOUNTER (OUTPATIENT)
Age: 29
Setting detail: HOSPITAL OUTPATIENT SURGERY
Discharge: HOME | End: 2024-09-17
Attending: INTERNAL MEDICINE | Admitting: INTERNAL MEDICINE
Payer: MEDICAID

## 2024-09-17 ENCOUNTER — ANESTHESIA (OUTPATIENT)
Dept: ENDOSCOPY | Age: 29
End: 2024-09-17
Payer: MEDICAID

## 2024-09-17 DIAGNOSIS — R14.0 BLOATING: ICD-10-CM

## 2024-09-17 DIAGNOSIS — K62.5 RECTAL BLEEDING: ICD-10-CM

## 2024-09-17 LAB — B-HCG UR QL: NEGATIVE

## 2024-09-17 PROCEDURE — 45380 COLONOSCOPY AND BIOPSY: CPT | Performed by: INTERNAL MEDICINE

## 2024-09-17 PROCEDURE — 81025 URINE PREGNANCY TEST: CPT

## 2024-09-17 PROCEDURE — 99070 SPECIAL SUPPLIES PHYS/QHP: CPT | Performed by: INTERNAL MEDICINE

## 2024-09-17 PROCEDURE — 88312 SPECIAL STAINS GROUP 1: CPT | Performed by: INTERNAL MEDICINE

## 2024-09-17 PROCEDURE — 88305 TISSUE EXAM BY PATHOLOGIST: CPT | Performed by: INTERNAL MEDICINE

## 2024-09-17 PROCEDURE — 43239 EGD BIOPSY SINGLE/MULTIPLE: CPT | Performed by: INTERNAL MEDICINE

## 2024-09-17 RX ORDER — SODIUM CHLORIDE, SODIUM LACTATE, POTASSIUM CHLORIDE, CALCIUM CHLORIDE 600; 310; 30; 20 MG/100ML; MG/100ML; MG/100ML; MG/100ML
INJECTION, SOLUTION INTRAVENOUS CONTINUOUS
Status: DISCONTINUED | OUTPATIENT
Start: 2024-09-17 | End: 2024-09-17

## 2024-09-17 RX ORDER — LIDOCAINE HYDROCHLORIDE 10 MG/ML
INJECTION, SOLUTION EPIDURAL; INFILTRATION; INTRACAUDAL; PERINEURAL AS NEEDED
Status: DISCONTINUED | OUTPATIENT
Start: 2024-09-17 | End: 2024-09-17 | Stop reason: SURG

## 2024-09-17 RX ADMIN — SODIUM CHLORIDE, SODIUM LACTATE, POTASSIUM CHLORIDE, CALCIUM CHLORIDE: 600; 310; 30; 20 INJECTION, SOLUTION INTRAVENOUS at 09:54:00

## 2024-09-17 RX ADMIN — LIDOCAINE HYDROCHLORIDE 25 MG: 10 INJECTION, SOLUTION EPIDURAL; INFILTRATION; INTRACAUDAL; PERINEURAL at 09:25:00

## 2024-09-17 NOTE — ANESTHESIA PREPROCEDURE EVALUATION
Anesthesia PreOp Note    HPI:     Carisa Khan is a 29 year old female who presents for preoperative consultation requested by: Cortez Gay MD    Date of Surgery: 9/17/2024    Procedure(s):  COLONOSCOPY / ESOPHAGOGASTRODUODENOSCOPY  ESOPHAGOGASTRODUODENOSCOPY (EGD)  Indication: Rectal bleeding / Bloating    Relevant Problems   No relevant active problems       NPO:  Last Liquid Consumption Date: 09/17/24  Last Liquid Consumption Time: 0710  Last Solid Consumption Date: 09/15/24  Last Solid Consumption Time: 2100  Last Liquid Consumption Date: 09/17/24          History Review:  There are no problems to display for this patient.      History reviewed. No pertinent past medical history.    History reviewed. No pertinent surgical history.    Medications Prior to Admission   Medication Sig Dispense Refill Last Dose    PANTOPRAZOLE 40 MG Oral Tab EC TAKE 1 TABLET BY MOUTH EVERY DAY IN THE MORNING BEFORE BREAKFAST (Patient not taking: Reported on 9/17/2024) 30 tablet 0 Not Taking    Na Sulfate-K Sulfate-Mg Sulf (SUPREP BOWEL PREP KIT) 17.5-3.13-1.6 GM/177ML Oral Solution Take prep as directed by gastro office. May substitute with Trilyte/generic equivalent if needed. 1 each 0      Current Facility-Administered Medications Ordered in Epic   Medication Dose Route Frequency Provider Last Rate Last Admin    lactated ringers infusion   Intravenous Continuous Cortez Gay MD         No current [facility]-ordered outpatient medications on file.       Allergies   Allergen Reactions    Bananas ITCHING       History reviewed. No pertinent family history.  Social History     Socioeconomic History    Marital status:    Tobacco Use    Smoking status: Never    Smokeless tobacco: Never   Vaping Use    Vaping status: Never Used   Substance and Sexual Activity    Alcohol use: Yes     Comment: occ    Drug use: Never       Available pre-op labs reviewed.  Lab Results   Component Value Date    WBC 8.5 08/09/2024    RBC 4.64  08/09/2024    HGB 14.5 08/09/2024    HCT 42.1 08/09/2024    MCV 90.7 08/09/2024    MCH 31.3 08/09/2024    MCHC 34.4 08/09/2024    RDW 12.4 08/09/2024    .0 08/09/2024    URINEPREG Negative 09/17/2024             Vital Signs:  Body mass index is 25.75 kg/m².   height is 1.626 m (5' 4\") and weight is 68 kg (150 lb).   Vitals:    09/12/24 1355   Weight: 68 kg (150 lb)   Height: 1.626 m (5' 4\")        Anesthesia Evaluation     Patient summary reviewed and Nursing notes reviewed    No history of anesthetic complications   Airway   Mallampati: II  Neck ROM: full  Dental      Pulmonary - negative ROS and normal exam   Cardiovascular - negative ROS and normal exam    Neuro/Psych - negative ROS     GI/Hepatic/Renal - negative ROS     Endo/Other - negative ROS   Abdominal  - normal exam                 Anesthesia Plan:   ASA:  2  Informed Consent Plan and Risks Discussed With:  Patient      I have informed Carisa Bill and/or legal guardian or family member of the nature of the anesthetic plan, benefits, risks including possible dental damage if relevant, major complications, and any alternative forms of anesthetic management.   All of the patient's questions were answered to the best of my ability. The patient desires the anesthetic management as planned.  ANI GUERRIER MD  9/17/2024 8:33 AM  Present on Admission:  **None**

## 2024-09-17 NOTE — DISCHARGE INSTRUCTIONS
Home Care Instructions for Colonoscopy and/or Gastroscopy with Sedation    Diet:  - Resume your regular diet as tolerated unless otherwise instructed.  - Start with light meals to minimize bloating.  - Do not drink alcohol today.    Medication:  - If you have questions about resuming your normal medications, please contact your Primary Care Physician.    Activities:  - Take it easy today. Do not return to work today.  - Do not drive today.  - Do not operate any machinery today (including kitchen equipment).    Colonoscopy:  - You may notice some rectal \"spotting\" (a little blood on the toilet tissue) for a day or two after the exam. This is normal.  - If you experience any rectal bleeding (not spotting), persistent tenderness or sharp severe abdominal pains, oral temperature over 100 degrees Fahrenheit, light-headedness or dizziness, or any other problems, contact your doctor.    Gastroscopy:  - You may have a sore throat for 2-3 days following the exam. This is normal. Gargling with warm salt water (1/2 tsp salt to 1 glass warm water) or using throat lozenges will help.  - If you experience any sharp pain in your neck, abdomen or chest, vomiting of blood, oral temperature over 100 degrees Fahrenheit, light-headedness or dizziness, or any other problems, contact your doctor.    **If unable to reach your doctor, please go to the [facility] Emergency Room**    - Your referring physician will receive a full report of your examination.  - If you do not hear from your doctor's office within two weeks of your biopsy, please call them for your results.    You may be able to see your laboratory results in [email or web address] between 4 and 7 business days.  In some cases, your physician may not have viewed the results before they are released to [email or web address].  If you have questions regarding your results contact the physician who ordered the test/exam by phone or via [email or web address] by choosing \"Ask a Medical Question.\"

## 2024-09-17 NOTE — H&P
History & Physical Examination    Patient Name: Carisa Khan  MRN: [id number]  CSN: [id number]  YOB: 1995    Diagnosis:   Constipation  Rectal bleeding   bloating    Medications Prior to Admission   Medication Sig Dispense Refill Last Dose    PANTOPRAZOLE 40 MG Oral Tab EC TAKE 1 TABLET BY MOUTH EVERY DAY IN THE MORNING BEFORE BREAKFAST (Patient not taking: Reported on 9/17/2024) 30 tablet 0 Not Taking    Na Sulfate-K Sulfate-Mg Sulf (SUPREP BOWEL PREP KIT) 17.5-3.13-1.6 GM/177ML Oral Solution Take prep as directed by gastro office. May substitute with Trilyte/generic equivalent if needed. 1 each 0      Current Facility-Administered Medications   Medication Dose Route Frequency    lactated ringers infusion   Intravenous Continuous       Allergies:   Allergies   Allergen Reactions    Bananas ITCHING       History reviewed. No pertinent past medical history.  History reviewed. No pertinent surgical history.  History reviewed. No pertinent family history.  Social History     Tobacco Use    Smoking status: Never    Smokeless tobacco: Never   Substance Use Topics    Alcohol use: Yes     Comment: occ       SYSTEM Check if Review is Normal Check if Physical Exam is Normal If not normal, please explain:   HEENT [x ] [ x]    NECK & BACK [x ] [x ]    HEART [x ] [ x]    LUNGS [x ] [ x]    ABDOMEN [x ] [x ]    UROGENITAL [ ] [ ]    EXTREMITIES [x ] [x ]    OTHER        [ x ] I have discussed the risks and benefits and alternatives with the patient/family.  They understand and agree to proceed with plan of care.  [ x ] I have reviewed the History and Physical done within the last 30 days.  Any changes noted above.    Cortez Gay MD  9/17/2024  9:18 AM

## 2024-09-17 NOTE — OPERATIVE REPORT
[facility] Endoscopy Report  Date of procedure-September 17, 2024    Preoperative Diagnosis:  -Abdominal pain/constipation  -Bloating      Postoperative Diagnosis:  -Diverticular disease  -Internal hemorrhoids  -Gastritis      Procedure:    Colonoscopy   Esophagogastroduodenoscopy       Surgeon:  Cortez Gay M.D.    Anesthesia:  MAC     Technique:  After informed consent, the patient was placed in the left lateral recumbent position.  Digital rectal examination revealed no palpable intraluminal abnormalities.  An Olympus variable stiffness 190 series HD colonoscope was inserted into the rectum and advanced under direct vision by following the lumen to the cecum.  The colon was examined upon withdrawal in the left lateral position.    Following colonoscopy, an Olympus adult HD gastroscope was inserted into the hypopharynx and advanced under direct vision into the esophagus, stomach and duodenum.  The endoscope was withdrawn to the stomach where retroflexion of the annulus, body, cardia and fundus was performed.  The instrument was straightened, insufflated air and fluid were suctioned and the endoscope was withdrawn.  The procedures were well tolerated without immediate complication.      Findings:  The preparation of the colon was good.  The terminal ileum was examined for 4 cm and visually normal.  The ileocecal valve was well preserved. The visualized colonic mucosa from the cecum to the anal verge was normal with an intact vascular pattern.    Diverticulosis located in the sigmoid colon, no diverticulitis.  Random samples were taken from the left colon due to subtle loss of vascular markings.    Small internal hemorrhoids noted on retroflexed view.    The esophagus was normal.  The GE junction and diaphragmatic impression were at 39 cm, no hiatal hernia.  The stomach distended appropriately with insufflated air.  The mucosa of the stomach showed subtle gastric erythema throughout the stomach.   No ulcers noted.  Biopsies taken.  The duodenal bulb and post bulbar regions were normal.    Estimated blood loss-insignificant  Specimens-see above    Impression:  -Diverticular disease  -Internal hemorrhoids  -Gastritis    Recommendations:  - Post procedure instructions given  - Repeat colonoscopy at CRC screening age  - High fiber diet for diverticular disease  - Symptomatic treatment of hemorrhoids  - Follow up on upper GI biopsies          Cortez Gay MD  9/17/2024  9:55 AM

## 2024-09-17 NOTE — ANESTHESIA POSTPROCEDURE EVALUATION
Patient: Carisa Khan    Procedure Summary       Date: 09/17/24 Room / Location: [facility] ENDOSCOPY 01 / [facility] ENDO    Anesthesia Start: 0924 Anesthesia Stop: 0958    Procedures:       COLONOSCOPY / ESOPHAGOGASTRODUODENOSCOPY      ESOPHAGOGASTRODUODENOSCOPY (EGD) Diagnosis:       Rectal bleeding      Bloating      (diverticulosis, hemorrhoids, gastritis.)    Surgeons: Cortez Gay MD Anesthesiologist: Ani Guerrier MD    Anesthesia Type: Not recorded ASA Status: 2            Anesthesia Type: No value filed.    Vitals Value Taken Time   /71 09/17/24 0956   Temp 98 09/17/24 0958   Pulse 98 09/17/24 0957   Resp 19 09/17/24 0957   SpO2 97 % 09/17/24 0957   Vitals shown include unfiled device data.    [facility] AN Post Evaluation:   Patient Evaluated in PACU  Patient Participation: complete - patient participated  Level of Consciousness: awake  Pain Management: adequate  Airway Patency:patent  Dental exam unchanged from preop  Yes    Cardiovascular Status: acceptable  Respiratory Status: acceptable  Postoperative Hydration acceptable      ANI GUERRIER MD  9/17/2024 9:58 AM

## 2024-09-18 VITALS
RESPIRATION RATE: 22 BRPM | HEART RATE: 70 BPM | WEIGHT: 184 LBS | DIASTOLIC BLOOD PRESSURE: 96 MMHG | HEIGHT: 64 IN | BODY MASS INDEX: 31.41 KG/M2 | OXYGEN SATURATION: 99 % | SYSTOLIC BLOOD PRESSURE: 121 MMHG

## 2024-09-20 ENCOUNTER — TELEPHONE (OUTPATIENT)
Facility: CLINIC | Age: 29
End: 2024-09-20

## 2024-09-20 NOTE — TELEPHONE ENCOUNTER
Recall entered into patient outreach in [facility] for when patient turns 45 in the year 2040.    Patient viewed below result note in MyChart:  Seen by patient Carisa Khan on 9/19/2024  5:21 PM

## 2024-09-20 NOTE — TELEPHONE ENCOUNTER
----- Message from Cortez Gay sent at 9/19/2024  3:07 PM CDT -----  I wanted to get back to you with your colonoscopy and EGD results.    No colon polyps were noted, would advise your next colonoscopy at age 45.   No colitis noted.  You do have diverticulosis and internal hemorrhoids.     The upper endoscopy showed some irritation stomach lining, biopsies were negative for H. pylori, no signs of infection.  This can be from acid were certainly foods or medications, would advise bland diet moving forward.      Call or follow-up with ongoing issues.

## 2024-10-19 ENCOUNTER — APPOINTMENT (OUTPATIENT)
Dept: CT IMAGING | Facility: HOSPITAL | Age: 29
End: 2024-10-19
Attending: NURSE PRACTITIONER
Payer: MEDICAID

## 2024-10-19 ENCOUNTER — HOSPITAL ENCOUNTER (EMERGENCY)
Facility: HOSPITAL | Age: 29
Discharge: HOME | End: 2024-10-19
Payer: MEDICAID

## 2024-10-19 VITALS
DIASTOLIC BLOOD PRESSURE: 90 MMHG | HEART RATE: 82 BPM | TEMPERATURE: 98 F | OXYGEN SATURATION: 97 % | RESPIRATION RATE: 18 BRPM | WEIGHT: 150 LBS | BODY MASS INDEX: 25.61 KG/M2 | HEIGHT: 64 IN | SYSTOLIC BLOOD PRESSURE: 163 MMHG

## 2024-10-19 DIAGNOSIS — G44.209 TENSION HEADACHE: Primary | ICD-10-CM

## 2024-10-19 LAB
ALBUMIN SERPL-MCNC: 4.8 G/DL (ref 3.2–4.8)
ALBUMIN/GLOB SERPL: 1.6 {RATIO} (ref 1–2)
ALP LIVER SERPL-CCNC: 93 U/L
ALT SERPL-CCNC: 215 U/L
ANION GAP SERPL CALC-SCNC: 8 MMOL/L (ref 0–18)
AST SERPL-CCNC: 99 U/L (ref ?–34)
B-HCG UR QL: NEGATIVE
BASOPHILS # BLD AUTO: 0.04 X10(3) UL (ref 0–0.2)
BASOPHILS NFR BLD AUTO: 0.4 %
BILIRUB SERPL-MCNC: 0.6 MG/DL (ref 0.3–1.2)
BUN BLD-MCNC: 14 MG/DL (ref 9–23)
BUN/CREAT SERPL: 21.5 (ref 10–20)
CALCIUM BLD-MCNC: 9.7 MG/DL (ref 8.7–10.4)
CHLORIDE SERPL-SCNC: 109 MMOL/L (ref 98–112)
CO2 SERPL-SCNC: 22 MMOL/L (ref 21–32)
CREAT BLD-MCNC: 0.65 MG/DL
DEPRECATED RDW RBC AUTO: 40.9 FL (ref 35.1–46.3)
EGFRCR SERPLBLD CKD-EPI 2021: 122 ML/MIN/1.73M2 (ref 60–?)
EOSINOPHIL # BLD AUTO: 0.41 X10(3) UL (ref 0–0.7)
EOSINOPHIL NFR BLD AUTO: 4.3 %
ERYTHROCYTE [DISTWIDTH] IN BLOOD BY AUTOMATED COUNT: 12.3 % (ref 11–15)
GLOBULIN PLAS-MCNC: 3 G/DL (ref 2–3.5)
GLUCOSE BLD-MCNC: 103 MG/DL (ref 70–99)
HCT VFR BLD AUTO: 40.2 %
HGB BLD-MCNC: 14 G/DL
IMM GRANULOCYTES # BLD AUTO: 0.05 X10(3) UL (ref 0–1)
IMM GRANULOCYTES NFR BLD: 0.5 %
LYMPHOCYTES # BLD AUTO: 3.26 X10(3) UL (ref 1–4)
LYMPHOCYTES NFR BLD AUTO: 34 %
MCH RBC QN AUTO: 31.8 PG (ref 26–34)
MCHC RBC AUTO-ENTMCNC: 34.8 G/DL (ref 31–37)
MCV RBC AUTO: 91.4 FL
MONOCYTES # BLD AUTO: 0.63 X10(3) UL (ref 0.1–1)
MONOCYTES NFR BLD AUTO: 6.6 %
NEUTROPHILS # BLD AUTO: 5.19 X10 (3) UL (ref 1.5–7.7)
NEUTROPHILS # BLD AUTO: 5.19 X10(3) UL (ref 1.5–7.7)
NEUTROPHILS NFR BLD AUTO: 54.2 %
OSMOLALITY SERPL CALC.SUM OF ELEC: 289 MOSM/KG (ref 275–295)
PLATELET # BLD AUTO: 322 10(3)UL (ref 150–450)
POTASSIUM SERPL-SCNC: 4 MMOL/L (ref 3.5–5.1)
PROT SERPL-MCNC: 7.8 G/DL (ref 5.7–8.2)
RBC # BLD AUTO: 4.4 X10(6)UL
SODIUM SERPL-SCNC: 139 MMOL/L (ref 136–145)
WBC # BLD AUTO: 9.6 X10(3) UL (ref 4–11)

## 2024-10-19 PROCEDURE — 36415 COLL VENOUS BLD VENIPUNCTURE: CPT

## 2024-10-19 PROCEDURE — 81025 URINE PREGNANCY TEST: CPT

## 2024-10-19 PROCEDURE — 85025 COMPLETE CBC W/AUTO DIFF WBC: CPT | Performed by: NURSE PRACTITIONER

## 2024-10-19 PROCEDURE — 70450 CT HEAD/BRAIN W/O DYE: CPT | Performed by: NURSE PRACTITIONER

## 2024-10-19 PROCEDURE — 80053 COMPREHEN METABOLIC PANEL: CPT | Performed by: NURSE PRACTITIONER

## 2024-10-19 PROCEDURE — 99284 EMERGENCY DEPT VISIT MOD MDM: CPT

## 2024-10-19 RX ORDER — ACETAMINOPHEN 500 MG
1000 TABLET ORAL ONCE
Status: COMPLETED | OUTPATIENT
Start: 2024-10-19 | End: 2024-10-19

## 2024-10-19 NOTE — ED INITIAL ASSESSMENT (HPI)
Pt arrives through triage with complaints of HA for 2 weeks and now nose bleed yesterday and earlier today, vertigo for 8 weeks.  Denies taking meds for HA.

## 2024-10-19 NOTE — ED PROVIDER NOTES
Patient Seen in: [facility] Emergency Department      History     Chief Complaint   Patient presents with    Headache     Stated Complaint: Headache    Subjective:   HPI      29-year-old female presents today with complaints of headache and nosebleed intermittently for the past 8 weeks.  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  Patient states that she does drink a lot of water but also does drink a lot of caffeine.  Patient has been doing a lot of reading and computer work.  Patient states that she uses contacts for reading.  Patient denies any visual disturbances associated with the headache.    Objective:     History reviewed. No pertinent past medical history.           Past Surgical History:   Procedure Laterality Date    Colonoscopy N/A 9/17/2024    Procedure: COLONOSCOPY;  Surgeon: Cortez Gay MD;  Location: [facility]                Social History     Socioeconomic History    Marital status:    Tobacco Use    Smoking status: Never    Smokeless tobacco: Never   Vaping Use    Vaping status: Never Used   Substance and Sexual Activity    Alcohol use: Yes     Comment: occ    Drug use: Never                  Physical Exam     ED Triage Vitals [10/19/24 1509]   BP (!) 163/90   Pulse 82   Resp 18   Temp 98.3 °F (36.8 °C)   Temp src Oral   SpO2 97 %   O2 Device None (Room air)       Current Vitals:   Vital Signs  BP: (!) 163/90  Pulse: 82  Resp: 18  Temp: 98.3 °F (36.8 °C)  Temp src: Oral  MAP (mmHg): (!) 115    Oxygen Therapy  SpO2: 97 %  O2 Device: None (Room air)        Physical Exam  Vitals and nursing note reviewed. Exam conducted with a chaperone present.   Constitutional:       Appearance: Normal appearance.   HENT:      Head: Normocephalic.      Right Ear: External ear normal.      Left Ear: External ear normal.      Nose: Nose normal.      Comments: Evidence of nosebleed in the left nare appreciated.  Dry mucous membranes.      Mouth/Throat:      Mouth: Mucous membranes are dry.      Pharynx: Oropharynx is clear.   Eyes:      Extraocular Movements: Extraocular movements intact.      Conjunctiva/sclera: Conjunctivae normal.      Pupils: Pupils are equal, round, and reactive to light.   Cardiovascular:      Rate and Rhythm: Normal rate and regular rhythm.      Pulses: Normal pulses.      Heart sounds: Normal heart sounds.   Pulmonary:      Effort: Pulmonary effort is normal.      Breath sounds: Normal breath sounds.   Musculoskeletal:      Cervical back: Normal range of motion and neck supple.   Skin:     General: Skin is warm.      Capillary Refill: Capillary refill takes less than 2 seconds.   Neurological:      General: No focal deficit present.      Mental Status: She is alert and oriented to person, place, and time.      Motor: No weakness.      Coordination: Coordination normal.      Gait: Gait normal.      Deep Tendon Reflexes: Reflexes normal.   Psychiatric:         Mood and Affect: Mood normal.           ED Course     Labs Reviewed   COMP METABOLIC PANEL (14) - Abnormal; Notable for the following components:       Result Value    Glucose 103 (*)     BUN/CREA Ratio 21.5 (*)      (*)     AST 99 (*)     All other components within normal limits   POCT PREGNANCY URINE - Normal   CBC WITH DIFFERENTIAL WITH PLATELET                   MDM      29-year-old female presents today with complaints of headache and nosebleed intermittently for the past 8 weeks.  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  Patient states that she does drink a lot of water but also does drink a lot of caffeine.  Patient has been doing a lot of reading and computer work.  Patient states that she uses contacts for reading.  Patient denies any visual disturbances associated with the headache.  Vital signs: Please see EMR.  Physical exam: Please see exam.  Differential diagnosis: Tension migraine, sinus  headache, nosebleed, intracranial process.  Recent Results (from the past 24 hours)   POCT Pregnancy, Urine    Collection Time: 10/19/24  3:37 PM   Result Value Ref Range    POCT Urine Pregnancy Negative Negative   CBC With Differential With Platelet    Collection Time: 10/19/24  3:49 PM   Result Value Ref Range    WBC 9.6 4.0 - 11.0 x10(3) uL    RBC 4.40 3.80 - 5.30 x10(6)uL    HGB 14.0 12.0 - 16.0 g/dL    HCT 40.2 35.0 - 48.0 %    MCV 91.4 80.0 - 100.0 fL    MCH 31.8 26.0 - 34.0 pg    MCHC 34.8 31.0 - 37.0 g/dL    RDW-SD 40.9 35.1 - 46.3 fL    RDW 12.3 11.0 - 15.0 %    .0 150.0 - 450.0 10(3)uL    Neutrophil Absolute Prelim 5.19 1.50 - 7.70 x10 (3) uL    Neutrophil Absolute 5.19 1.50 - 7.70 x10(3) uL    Lymphocyte Absolute 3.26 1.00 - 4.00 x10(3) uL    Monocyte Absolute 0.63 0.10 - 1.00 x10(3) uL    Eosinophil Absolute 0.41 0.00 - 0.70 x10(3) uL    Basophil Absolute 0.04 0.00 - 0.20 x10(3) uL    Immature Granulocyte Absolute 0.05 0.00 - 1.00 x10(3) uL    Neutrophil % 54.2 %    Lymphocyte % 34.0 %    Monocyte % 6.6 %    Eosinophil % 4.3 %    Basophil % 0.4 %    Immature Granulocyte % 0.5 %   Comp Metabolic Panel (14)    Collection Time: 10/19/24  3:49 PM   Result Value Ref Range    Glucose 103 (H) 70 - 99 mg/dL    Sodium 139 136 - 145 mmol/L    Potassium 4.0 3.5 - 5.1 mmol/L    Chloride 109 98 - 112 mmol/L    CO2 22.0 21.0 - 32.0 mmol/L    Anion Gap 8 0 - 18 mmol/L    BUN 14 9 - 23 mg/dL    Creatinine 0.65 0.55 - 1.02 mg/dL    BUN/CREA Ratio 21.5 (H) 10.0 - 20.0    Calcium, Total 9.7 8.7 - 10.4 mg/dL    Calculated Osmolality 289 275 - 295 mOsm/kg    eGFR-Cr 122 >=60 mL/min/1.73m2     (H) 10 - 49 U/L    AST 99 (H) <34 U/L    Alkaline Phosphatase 93 37 - 98 U/L    Bilirubin, Total 0.6 0.3 - 1.2 mg/dL    Total Protein 7.8 5.7 - 8.2 g/dL    Albumin 4.8 3.2 - 4.8 g/dL    Globulin  3.0 2.0 - 3.5 g/dL    A/G Ratio 1.6 1.0 - 2.0   CT BRAIN OR HEAD (CPT=70450)    Result Date: 10/19/2024  CONCLUSION:  No acute  intracranial process.    Dictated by (CST): Vazquez Villegas MD on 10/19/2024 at 4:19 PM     Finalized by (CST): Vazquez Villegas MD on 10/19/2024 at 4:20 PM         Based on physical exam and HPI will diagnosed with tension headache.  Patient instructed to increase her water intake.  Have her prescription checked for her eye contacts.  Patient instructed that she may take Tylenol or Motrin for headache.  Will prescribe naproxen twice daily for 7 days.  Patient is to follow-up with primary care provider within 2 to 3 days.  ED precautions given.          Medical Decision Making  29-year-old female presents today with complaints of headache and nosebleed intermittently for the past 8 weeks.  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  Patient states that she does drink a lot of water but also does drink a lot of caffeine.  Patient has been doing a lot of reading and computer work.  Patient states that she uses contacts for reading.  Patient denies any visual disturbances associated with the headache.    Problems Addressed:  Tension headache: acute illness or injury    Amount and/or Complexity of Data Reviewed  Labs: ordered. Decision-making details documented in ED Course.  Radiology: ordered. Decision-making details documented in ED Course.  ECG/medicine tests: ordered. Decision-making details documented in ED Course.        Disposition and Plan     Clinical Impression:  1. Tension headache         Disposition:  Discharge  10/19/2024  4:57 pm    Follow-up:  Dayday Luna MD  [address] [place] [id number]  [phone]    Follow up in 1 week(s)            Medications Prescribed:  Current Discharge Medication List              Supplementary Documentation:

## 2024-12-17 ENCOUNTER — HOSPITAL ENCOUNTER (OUTPATIENT)
Dept: ULTRASOUND IMAGING | Age: 29
Discharge: HOME | End: 2024-12-17
Attending: NURSE PRACTITIONER
Payer: MEDICAID

## 2024-12-17 DIAGNOSIS — E28.2 PCOS (POLYCYSTIC OVARIAN SYNDROME): ICD-10-CM

## 2024-12-17 DIAGNOSIS — N97.9 INFERTILITY, FEMALE: ICD-10-CM

## 2024-12-17 PROCEDURE — 76856 US EXAM PELVIC COMPLETE: CPT | Performed by: NURSE PRACTITIONER

## 2024-12-17 PROCEDURE — 76830 TRANSVAGINAL US NON-OB: CPT | Performed by: NURSE PRACTITIONER

## 2025-01-10 ENCOUNTER — LAB ENCOUNTER (OUTPATIENT)
Dept: LAB | Facility: REFERENCE LAB | Age: 30
End: 2025-01-10
Attending: INTERNAL MEDICINE
Payer: MEDICAID

## 2025-01-10 ENCOUNTER — OFFICE VISIT (OUTPATIENT)
Dept: INTERNAL MEDICINE CLINIC | Facility: CLINIC | Age: 30
End: 2025-01-10
Payer: MEDICAID

## 2025-01-10 VITALS
HEART RATE: 98 BPM | WEIGHT: 189 LBS | SYSTOLIC BLOOD PRESSURE: 135 MMHG | TEMPERATURE: 99 F | DIASTOLIC BLOOD PRESSURE: 80 MMHG | BODY MASS INDEX: 32.27 KG/M2 | OXYGEN SATURATION: 97 % | HEIGHT: 64 IN

## 2025-01-10 DIAGNOSIS — M25.50 ARTHRALGIA, UNSPECIFIED JOINT: ICD-10-CM

## 2025-01-10 DIAGNOSIS — M25.50 ARTHRALGIA, UNSPECIFIED JOINT: Primary | ICD-10-CM

## 2025-01-10 PROBLEM — E28.2 PCOS (POLYCYSTIC OVARIAN SYNDROME): Status: ACTIVE | Noted: 2025-01-10

## 2025-01-10 LAB
CRP SERPL-MCNC: 1.1 MG/DL (ref ?–1)
ERYTHROCYTE [SEDIMENTATION RATE] IN BLOOD: 40 MM/HR
RHEUMATOID FACT SERPL-ACNC: 6 IU/ML (ref ?–14)

## 2025-01-10 PROCEDURE — 86225 DNA ANTIBODY NATIVE: CPT

## 2025-01-10 PROCEDURE — 99204 OFFICE O/P NEW MOD 45 MIN: CPT | Performed by: INTERNAL MEDICINE

## 2025-01-10 PROCEDURE — 85652 RBC SED RATE AUTOMATED: CPT

## 2025-01-10 PROCEDURE — 86431 RHEUMATOID FACTOR QUANT: CPT

## 2025-01-10 PROCEDURE — 86140 C-REACTIVE PROTEIN: CPT

## 2025-01-10 PROCEDURE — 86038 ANTINUCLEAR ANTIBODIES: CPT

## 2025-01-10 PROCEDURE — 36415 COLL VENOUS BLD VENIPUNCTURE: CPT

## 2025-01-10 RX ORDER — METFORMIN HYDROCHLORIDE 500 MG/1
500 TABLET, EXTENDED RELEASE ORAL DAILY
Qty: 90 TABLET | Refills: 0 | Status: SHIPPED | OUTPATIENT
Start: 2025-01-10

## 2025-01-10 RX ORDER — CHOLECALCIFEROL (VITAMIN D3) 50 MCG
2000 TABLET ORAL DAILY
COMMUNITY

## 2025-01-10 NOTE — PROGRESS NOTES
Subjective:   Patient ID: Carisa Khan is a 29 year old female.    Joint Pain        History/Other:   She came in today as a new patient.  According to her she has past medical history of PCOS struggles with infertility she is about to see a fertility specialist.    According to her for the last 6 months she is having on and off joint ache she has left joint pain in her left knee on and off swells up.  Also on and off she develops a rash on her face that looks like a butterfly rash mostly on her cheeks and her nose.  It happened 1 week ago as well and it resolved on its own.    Review of Systems   Constitutional: Negative.    HENT: Negative.     Eyes: Negative.    Respiratory: Negative.     Cardiovascular: Negative.    Gastrointestinal: Negative.    Genitourinary: Negative.    Musculoskeletal:  Positive for joint pain.   Skin: Negative.    Neurological: Negative.    Hematological: Negative.    Psychiatric/Behavioral: Negative.       Current Outpatient Medications   Medication Sig Dispense Refill    cholecalciferol 50 MCG (2000 UT) Oral Tab Take 1 tablet (2,000 Units total) by mouth daily.       Allergies:Allergies[1]    Past Medical History:    Diverticulosis    Gastritis    Hemorrhoid    Hypothyroidism    Takes vitamin d daily    PCOS (polycystic ovarian syndrome)      Past Surgical History:   Procedure Laterality Date    Colonoscopy N/A 9/17/2024    Procedure: COLONOSCOPY;  Surgeon: Cortez Gay MD;  Location: [facility]      Family History   Problem Relation Age of Onset    Cancer Mother     Diabetes Mother     Cancer Father     Cancer Paternal Grandmother       Social History:   Social History     Socioeconomic History    Marital status:    Tobacco Use    Smoking status: Never    Smokeless tobacco: Never   Vaping Use    Vaping status: Never Used   Substance and Sexual Activity    Alcohol use: Not Currently     Comment: occ    Drug use: Never    Sexual activity: Yes     Partners: Male     Social  Drivers of Health     Financial Resource Strain: Not on File (2024)    Received from [facility]    Financial Resource Strain     Financial Resource Strain: 0   Food Insecurity: Not on File (2024)    Received from [facility]    Food Insecurity     Food: 0   Transportation Needs: Not on File (2024)    Received from [facility]    Transportation Needs     Transportation: 0   Physical Activity: Not on File (2024)    Received from [facility]    Physical Activity     Physical Activity: 0   Stress: Not on File (2024)    Received from [facility]    Stress     Stress: 0   Social Connections: Not on File (2024)    Received from [facility]    Social Connections     Connectedness: 0   Housing Stability: Not on File (2024)    Received from [facility]    Housing Stability     Housin        Objective:   Physical Exam  Vitals and nursing note reviewed.   Constitutional:       Appearance: Normal appearance.   HENT:      Head: Normocephalic and atraumatic.   Cardiovascular:      Rate and Rhythm: Normal rate and regular rhythm.      Pulses: Normal pulses.      Heart sounds: Normal heart sounds.   Pulmonary:      Effort: Pulmonary effort is normal.      Breath sounds: Normal breath sounds.   Abdominal:      Palpations: Abdomen is soft.   Musculoskeletal:         General: Normal range of motion.      Cervical back: Normal range of motion and neck supple.   Skin:     General: Skin is warm.   Neurological:      Mental Status: She is alert. Mental status is at baseline.   Psychiatric:         Mood and Affect: Mood normal.         Assessment & Plan:   Arthralgia, rash, I will order labs to check for connective tissue disease    Ifg-her hemoglobin A1c noted, discussed about diet, will start on metformin will help with her pcos as well         [1]   Allergies  Allergen Reactions    Latex HIVES    Bananas ITCHING

## 2025-01-14 LAB
DSDNA IGG SERPL IA-ACNC: 1.4 IU/ML
ENA AB SER QL IA: 0.2 UG/L
ENA AB SER QL IA: NEGATIVE

## 2025-01-15 ENCOUNTER — HOSPITAL ENCOUNTER (OUTPATIENT)
Dept: GENERAL RADIOLOGY | Facility: HOSPITAL | Age: 30
Discharge: HOME | End: 2025-01-15
Attending: INTERNAL MEDICINE
Payer: MEDICAID

## 2025-01-15 ENCOUNTER — LAB ENCOUNTER (OUTPATIENT)
Dept: LAB | Facility: HOSPITAL | Age: 30
End: 2025-01-15
Attending: INTERNAL MEDICINE
Payer: MEDICAID

## 2025-01-15 ENCOUNTER — OFFICE VISIT (OUTPATIENT)
Dept: RHEUMATOLOGY | Facility: CLINIC | Age: 30
End: 2025-01-15
Payer: MEDICAID

## 2025-01-15 VITALS
HEART RATE: 65 BPM | HEIGHT: 64 IN | WEIGHT: 189 LBS | BODY MASS INDEX: 32.27 KG/M2 | DIASTOLIC BLOOD PRESSURE: 84 MMHG | SYSTOLIC BLOOD PRESSURE: 126 MMHG

## 2025-01-15 DIAGNOSIS — M25.572 CHRONIC PAIN OF LEFT ANKLE: ICD-10-CM

## 2025-01-15 DIAGNOSIS — M25.532 LEFT WRIST PAIN: ICD-10-CM

## 2025-01-15 DIAGNOSIS — G89.29 CHRONIC PAIN OF LEFT ANKLE: ICD-10-CM

## 2025-01-15 DIAGNOSIS — M25.50 POLYARTHRALGIA: Primary | ICD-10-CM

## 2025-01-15 DIAGNOSIS — M25.50 POLYARTHRALGIA: ICD-10-CM

## 2025-01-15 LAB
CRP SERPL-MCNC: 0.7 MG/DL (ref ?–1)
ERYTHROCYTE [SEDIMENTATION RATE] IN BLOOD: 39 MM/HR

## 2025-01-15 PROCEDURE — 86140 C-REACTIVE PROTEIN: CPT | Performed by: INTERNAL MEDICINE

## 2025-01-15 PROCEDURE — 99244 OFF/OP CNSLTJ NEW/EST MOD 40: CPT | Performed by: INTERNAL MEDICINE

## 2025-01-15 PROCEDURE — 81374 HLA I TYPING 1 ANTIGEN LR: CPT

## 2025-01-15 PROCEDURE — 73110 X-RAY EXAM OF WRIST: CPT | Performed by: INTERNAL MEDICINE

## 2025-01-15 PROCEDURE — 86200 CCP ANTIBODY: CPT | Performed by: INTERNAL MEDICINE

## 2025-01-15 PROCEDURE — 85652 RBC SED RATE AUTOMATED: CPT | Performed by: INTERNAL MEDICINE

## 2025-01-15 PROCEDURE — 73610 X-RAY EXAM OF ANKLE: CPT | Performed by: INTERNAL MEDICINE

## 2025-01-15 PROCEDURE — 36415 COLL VENOUS BLD VENIPUNCTURE: CPT | Performed by: INTERNAL MEDICINE

## 2025-01-15 NOTE — PROGRESS NOTES
Carisa Khan is a 29 year old female who presents for   Chief Complaint   Patient presents with    Consult     NP referred by PCP  for abn lab results     Ankle Pain     Harsh     Wrist Pain     Left    .   HPI:   CC: ankle pain, wrist pain  Consult: referred by PCP Dr. Rodriguez     This is a 30 yo F with hx of Pre-DM, Gastritis, Migraine's referred for joint pain. She developed a facial rash last week. Rash was dry and scaly. She also had left knee swelling, b/l ankle pain and left wrist pain. Joint pain has been intermittent for the last 6 mos.   She has stiffness in the morning arthur the left ankle. It improves throughout the day.  Left index finger will swell at times. She can make a fist but painful at times.  No shoulder or elbow pain.  She is hypermobile in the elbows.  She has had lower back pain, comes and goes.  No psoriasis.   She takes tylenol or advil as needed.   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     Blood work:  Neg CTD screen, dsDNA  Neg RF  ESR 40, CRP 1.1    Wt Readings from Last 2 Encounters:   01/15/25 189 lb (85.7 kg)   01/10/25 189 lb (85.7 kg)     Body mass index is 32.44 kg/m².      Current Outpatient Medications   Medication Sig Dispense Refill    cholecalciferol 50 MCG (2000 UT) Oral Tab Take 1 tablet (2,000 Units total) by mouth daily.      metFORMIN  MG Oral Tablet 24 Hr Take 1 tablet (500 mg total) by mouth daily. 90 tablet 0      Past Medical History:    Diverticulosis    Gastritis    Hemorrhoid    Hypothyroidism    Takes vitamin d daily    PCOS (polycystic ovarian syndrome)      Past Surgical History:   Procedure Laterality Date    Colonoscopy N/A 9/17/2024    Procedure: COLONOSCOPY;  Surgeon: Crotez Gay MD;  Location: [facility]      Family History   Problem Relation Age of Onset    Cancer Mother     Diabetes Mother     Cancer Father     Cancer Paternal Grandmother       Social History:  Social History     Socioeconomic History     Marital status:    Tobacco Use    Smoking status: Never    Smokeless tobacco: Never   Vaping Use    Vaping status: Never Used   Substance and Sexual Activity    Alcohol use: Not Currently     Comment: occ    Drug use: Never    Sexual activity: Yes     Partners: Male     Social Drivers of Health     Financial Resource Strain: Not on File (11/12/2024)    Received from [email or web address]    Financial Resource Strain     Financial Resource Strain: 0   Food Insecurity: No Food Insecurity (1/10/2025)    NCSS - Food Insecurity     Worried About Running Out of Food in the Last Year: No     Ran Out of Food in the Last Year: No   Transportation Needs: No Transportation Needs (1/10/2025)    NCSS - Transportation     Lack of Transportation: No   Physical Activity: Not on File (11/12/2024)    Received from [email or web address]    Physical Activity     Physical Activity: 0   Stress: Not on File (11/12/2024)    Received from [email or web address]    Stress     Stress: 0   Social Connections: Not on File (11/12/2024)    Received from [email or web address]    Social Connections     Connectedness: 0   Housing Stability: Not At Risk (1/10/2025)    NCSS - Housing/Utilities     Has Housing: Yes     Worried About Losing Housing: No     Unable to Get Utilities: No           REVIEW OF SYSTEMS:   Review Of Systems:  Constitutional: No fever, no change in weight or appetitie  Derm: No rashes, no oral ulcers, no alopecia, no photosensitivity, no psoriasis  HEENT: No dry eyes, no dry mouth, no Raynaud's, no nasal ulcers, no parotid swelling, no neck pain, no jaw pain, no temple pain  Eyes: No visual changes,   CVS: No chest pain, no heart disease  RS: No SOB, no Cough, No Pleurtic pain,   GI: No nausea, no vomiiting, no abominal pain, no hx of ulcer, no gastritis, no heartburn, no dyshpagia, no BRBPR or melena  : no dysuria, no hx of miscarriages, no DVT Hx, no hx of OCP,   Neuro: No numbness or tingling, no headache, no hx of seizures,   Psych: no hx of anxiety or depression  ENDO: no hx  of thyroid disease, no hx of DM  Joint/Muscluskeltal: see HPI,   All other ROS are negative.     EXAM:   /84 (BP Location: Left arm, Patient Position: Sitting, Cuff Size: adult)   Pulse 65   Ht 5' 4\" (1.626 m)   Wt 189 lb (85.7 kg)   LMP 09/04/2024 (Approximate)   BMI 32.44 kg/m²   GEN: AAOx3, NAD  HEENT: EOMI, PERRLA, no injection or icterus, oral mucosa moist, no oral lesions. No lymphadenopathy. No facial rash  CVS: RRR, no murmurs rubs or gallops. Equal 2+ distal pulses.   LUNGS: CTAB, no increased work of breathing  ABDOMEN:  soft NT/ND, +BS, no HSM  SKIN: No rashes or skin lesions. No nail findings  MSK:  Left wrist no swelling. Left wrist TTP  Passively dorsiflex the fifth MC P joint past 90 degrees  Hyperextend the elbows  Place the hands flat on the floor without bending her knees  NEURO: Cranial nerves II-XII intact grossly. 5/5 strength throughout in both upper and lower extremities, sensation intact.  PSYCH: normal mood    LABS:     Component      Latest Ref Rng 1/10/2025   Expanded NAGA Antibody Screen, IGG      <0.7 ug/l 0.20    Anti-dsDNA antibody      <10 IU/mL 1.4    Connective Tissue Disease Screen Interpretation      Negative  Negative    RHEUMATOID FACTOR      <14.0 IU/mL 6.0    SED RATE      0 - 20 mm/Hr 40 (H)    C-REACTIVE PROTEIN      <1.00 mg/dL 1.10 (H)         IMAGING:     none    ASSESSMENT AND PLAN:     Polyarthralgias, worsening pain in her left wrist, ankles and knees  - No swelling or synovitis on exam.  No history of psoriasis.  Left wrist is tender to palpation  - She is also hypermobile which could be contributing some of her joint pain  - Inflammation markers ESR and CRP were high.  Negative RF  - Plan to obtain further blood work.  X-rays of the left ankle and wrist  - For now she will continue Tylenol and Aleve as needed    Thank you for allowing me to participate in this patients care. Pt will f/u in 2 mos     Digna Fonseca MD  1/15/2025  2:13 PM

## 2025-01-15 NOTE — PATIENT INSTRUCTIONS
You were seen today for joint pain involving your hands, wrists, knees and ankles  Lets evaluate further to see if anything autoimmune is going on  Plan to get blood work and x-rays today  Try making appointment in March 27

## 2025-01-17 LAB — CCP IGG SERPL-ACNC: 1.7 U/ML (ref 0–6.9)

## 2025-01-22 LAB
HLA B27 SCREENING: NEGATIVE
HLA B27 SCREENING: NEGATIVE

## 2025-03-27 ENCOUNTER — TELEMEDICINE (OUTPATIENT)
Dept: INTERNAL MEDICINE CLINIC | Facility: CLINIC | Age: 30
End: 2025-03-27
Payer: MEDICAID

## 2025-03-27 ENCOUNTER — TELEPHONE (OUTPATIENT)
Dept: RHEUMATOLOGY | Facility: CLINIC | Age: 30
End: 2025-03-27

## 2025-03-27 ENCOUNTER — OFFICE VISIT (OUTPATIENT)
Age: 30
End: 2025-03-27
Payer: MEDICAID

## 2025-03-27 VITALS
SYSTOLIC BLOOD PRESSURE: 125 MMHG | HEIGHT: 64 IN | BODY MASS INDEX: 31.58 KG/M2 | HEART RATE: 71 BPM | WEIGHT: 185 LBS | DIASTOLIC BLOOD PRESSURE: 83 MMHG

## 2025-03-27 DIAGNOSIS — M25.50 ARTHRALGIA, UNSPECIFIED JOINT: Primary | ICD-10-CM

## 2025-03-27 DIAGNOSIS — M25.532 LEFT WRIST PAIN: Primary | ICD-10-CM

## 2025-03-27 DIAGNOSIS — M25.50 POLYARTHRALGIA: ICD-10-CM

## 2025-03-27 PROCEDURE — 99214 OFFICE O/P EST MOD 30 MIN: CPT | Performed by: INTERNAL MEDICINE

## 2025-03-27 PROCEDURE — 99213 OFFICE O/P EST LOW 20 MIN: CPT | Performed by: INTERNAL MEDICINE

## 2025-03-27 RX ORDER — METFORMIN HYDROCHLORIDE 500 MG/1
500 TABLET, EXTENDED RELEASE ORAL DAILY
Qty: 90 TABLET | Refills: 0 | Status: SHIPPED | OUTPATIENT
Start: 2025-03-27

## 2025-03-27 NOTE — PROGRESS NOTES
This is a telemedicine visit with live, interactive video and audio.     Patient understands and accepts financial responsibility for any deductible, co-insurance and/or co-pays associated with this service.    SUBJECTIVE    She scheduled video visit today for follow-up.  According to the patient she was seen in January by rheumatology due to joint pain and swelling as well as rash.  She did have at that time elevated sed rate and CRP.  She is not started on medication that she did advise her to follow-up.  She has appointment today.  She needs new referral.  She states that she still has a lot of swelling on her left wrist and pain    She also needs a refill on her metformin  HISTORY:  Past Medical History:    Diverticulosis    Gastritis    Hemorrhoid    Hypothyroidism    Takes vitamin d daily    PCOS (polycystic ovarian syndrome)      Past Surgical History:   Procedure Laterality Date    Colonoscopy N/A 9/17/2024    Procedure: COLONOSCOPY;  Surgeon: Cortez Gay MD;  [facility]: [facility]      Family History   Problem Relation Age of Onset    Cancer Mother     Diabetes Mother     Cancer Father     Cancer Paternal Grandmother       Social History     Socioeconomic History    Marital status:    Tobacco Use    Smoking status: Never    Smokeless tobacco: Never   Vaping Use    Vaping status: Never Used   Substance and Sexual Activity    Alcohol use: Not Currently     Comment: occ    Drug use: Never    Sexual activity: Yes     Partners: Male     Social Drivers of Health     Food Insecurity: No Food Insecurity (1/10/2025)    NCSS - Food Insecurity     Worried About Running Out of Food in the Last Year: No     Ran Out of Food in the Last Year: No   Transportation Needs: No Transportation Needs (1/10/2025)    NCSS - Transportation     Lack of Transportation: No   Stress: Not on File (11/12/2024)    Received from [facility]    Stress     Stress: 0   Housing Stability: Not At Risk (1/10/2025)    NCSS - Housing/Utilities      Has Housing: Yes     Worried About Losing Housing: No     Unable to Get Utilities: No        Allergies[1]   Current Outpatient Medications   Medication Sig Dispense Refill    cholecalciferol 50 MCG (2000 UT) Oral Tab Take 1 tablet (2,000 Units total) by mouth daily.      metFORMIN  MG Oral Tablet 24 Hr Take 1 tablet (500 mg total) by mouth daily. 90 tablet 0     Ros joint pain and swelling  OBJECTIVE  Physical Exam:   Awake alert oriented    ASSESSMENT & PLAN  There are no diagnoses linked to this encounter.    Arthralgia, labs reviewed, rheumatology notes reviewed, she will follow-up today    PCOS- continue with metformin    Time 20 min  KARL PORTER MD             [1]   Allergies  Allergen Reactions    Latex HIVES    Bananas ITCHING

## 2025-03-27 NOTE — PATIENT INSTRUCTIONS
You are seen today for joint pain mostly your left wrist and knee  Are slightly high but improved  You are also hypermobile that can cause joint pain  Lets get MRI of the left wrist for further evaluationInflammation

## 2025-03-27 NOTE — PROGRESS NOTES
Carisa Khan is a 30 year old female.    HPI:     Chief Complaint   Patient presents with    Follow - Up    Knee Pain     Left     Wrist Pain     Left        I had the pleasure of seeing Carisa Khan on 3/27/2025 for follow up Polyarthralgia's, elevated inflammation markers     Current Medications:  Tylenol and Aleve as needed   Blood work:  Neg CTD, RF, CCP, HLA-B27  ESR 39, CRP 1.1--> normal    Interval History:  This is a 28 yo F with hx of Pre-DM, Gastritis, Migraine's referred for joint pain. She developed a facial rash last week. Rash was dry and scaly. She also had left knee swelling, b/l ankle pain and left wrist pain. Joint pain has been intermittent for the last 6 mos.   She has stiffness in the morning arthur the left ankle. It improves throughout the day.  Left index finger will swell at times. She can make a fist but painful at times.  No shoulder or elbow pain.  She is hypermobile in the elbows.  She has had lower back pain, comes and goes.  No psoriasis.   She takes tylenol or advil as needed.   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     3/27/2025:  Presents for f/u of     She testes positive for pregnancy but has chem pregnancies  Now has pweroid since being on metformin  Having pain in the left wrist and left knee  No swelling in joints  Working out more and walking  Left knee was swollen a few days ago  Still taking tylen and aleve as needed     HISTORY:  Past Medical History:    Diverticulosis    Gastritis    Hemorrhoid    Hypothyroidism    Takes vitamin d daily    PCOS (polycystic ovarian syndrome)      Social Hx Reviewed   Family Hx Reviewed     Medications (Active prior to today's visit):  Current Outpatient Medications   Medication Sig Dispense Refill    metFORMIN  MG Oral Tablet 24 Hr Take 1 tablet (500 mg total) by mouth daily. 90 tablet 0    cholecalciferol 50 MCG (2000 UT) Oral Tab Take 1 tablet (2,000 Units total) by mouth daily.        .cmed  Allergies:  Allergies[1]      ROS:   All other ROS are negative.     PHYSICAL EXAM:   GEN: AAOx3, NAD  HEENT: EOMI, PERRLA, no injection or icterus, oral mucosa moist, no oral lesions. No lymphadenopathy. No facial rash  CVS: RRR, no murmurs rubs or gallops. Equal 2+ distal pulses.   LUNGS: CTAB, no increased work of breathing  ABDOMEN:  soft NT/ND, +BS, no HSM  SKIN: No rashes or skin lesions. No nail findings  MSK:  Left wrist no swelling. Left wrist TTP  Passively dorsiflex the fifth MC P joint past 90 degrees  Hyperextend the elbows  Place the hands flat on the floor without bending her knees  NEURO: Cranial nerves II-XII intact grossly. 5/5 strength throughout in both upper and lower extremities, sensation intact.  PSYCH: normal mood       LABS:     Component      Latest Ref Rng 1/10/2025 1/15/2025   Expanded NAGA Antibody Screen, IGG      <0.7 ug/l 0.20     Anti-dsDNA antibody      <10 IU/mL 1.4     Connective Tissue Disease Screen Interpretation      Negative  Negative     HLA-B27 Screening  Negative    HLA-B27 Interpretation  *    Reviewed By  SEE COMMENT    RHEUMATOID FACTOR      <14.0 IU/mL 6.0     SED RATE      0 - 20 mm/Hr 40 (H)  39 (H)    C-REACTIVE PROTEIN      <1.00 mg/dL 1.10 (H)  0.70    C-Citrullinated Peptide IgG AB      0.0 - 6.9 U/mL  1.7         Imaging:     XR left ankle: normal    XR left wrist: normal    ASSESSMENT/PLAN:     Polyarthralgias, worsening pain in her left wrist, ankles and knees  - No swelling or synovitis on exam.  No history of psoriasis.  Left wrist is tender to palpation  - She is also hypermobile which could be contributing some of her joint pain  - Inflammation markers are slightly high but no swelling in her wrist  - Plan to obtain MRI of the left wrist for further evaluation  - She will continue Tylenol and Aleve as needed    Pt will f/u in 3  mos    There is a longitudinal care relationship with me, the care plan reflects the ongoing nature of the continuous  relationship of care, and the medical record indicates that there is ongoing treatment of a serious/complex medical condition which I am currently managing.  is Applicable.     Digna Fonseca MD  3/27/2025   10:21 AM                 [1]   Allergies  Allergen Reactions    Latex HIVES    Bananas ITCHING

## 2025-03-28 ENCOUNTER — LAB ENCOUNTER (OUTPATIENT)
Dept: LAB | Facility: HOSPITAL | Age: 30
End: 2025-03-28
Attending: INTERNAL MEDICINE
Payer: MEDICAID

## 2025-03-28 DIAGNOSIS — N92.6 MISSED PERIOD: ICD-10-CM

## 2025-03-28 LAB — B-HCG SERPL-ACNC: <2.6 MIU/ML

## 2025-03-28 PROCEDURE — 84702 CHORIONIC GONADOTROPIN TEST: CPT

## 2025-03-28 PROCEDURE — 36415 COLL VENOUS BLD VENIPUNCTURE: CPT

## 2025-04-25 ENCOUNTER — HOSPITAL ENCOUNTER (OUTPATIENT)
Dept: MRI IMAGING | Facility: HOSPITAL | Age: 30
Discharge: HOME | End: 2025-04-25
Attending: INTERNAL MEDICINE
Payer: MEDICAID

## 2025-04-25 DIAGNOSIS — M25.532 LEFT WRIST PAIN: ICD-10-CM

## 2025-04-25 PROCEDURE — 73223 MRI JOINT UPR EXTR W/O&W/DYE: CPT | Performed by: INTERNAL MEDICINE

## 2025-04-25 PROCEDURE — A9575 INJ GADOTERATE MEGLUMI 0.1ML: HCPCS | Performed by: INTERNAL MEDICINE

## 2025-04-25 RX ORDER — GADOTERATE MEGLUMINE 376.9 MG/ML
20 INJECTION INTRAVENOUS
Status: COMPLETED | OUTPATIENT
Start: 2025-04-25 | End: 2025-04-25

## 2025-04-25 RX ADMIN — GADOTERATE MEGLUMINE 17 ML: 376.9 INJECTION INTRAVENOUS at 13:16:00

## 2025-05-14 ENCOUNTER — PATIENT MESSAGE (OUTPATIENT)
Dept: INTERNAL MEDICINE CLINIC | Facility: CLINIC | Age: 30
End: 2025-05-14

## 2025-07-03 ENCOUNTER — OFFICE VISIT (OUTPATIENT)
Dept: INTERNAL MEDICINE CLINIC | Facility: CLINIC | Age: 30
End: 2025-07-03
Payer: MEDICAID

## 2025-07-03 ENCOUNTER — LAB ENCOUNTER (OUTPATIENT)
Dept: LAB | Facility: HOSPITAL | Age: 30
End: 2025-07-03
Attending: INTERNAL MEDICINE
Payer: MEDICAID

## 2025-07-03 VITALS
SYSTOLIC BLOOD PRESSURE: 127 MMHG | OXYGEN SATURATION: 98 % | DIASTOLIC BLOOD PRESSURE: 82 MMHG | BODY MASS INDEX: 31.92 KG/M2 | HEIGHT: 64 IN | WEIGHT: 187 LBS | HEART RATE: 62 BPM

## 2025-07-03 DIAGNOSIS — N92.6 MISSED PERIOD: ICD-10-CM

## 2025-07-03 DIAGNOSIS — E28.2 PCOS (POLYCYSTIC OVARIAN SYNDROME): Primary | ICD-10-CM

## 2025-07-03 DIAGNOSIS — N92.6 IRREGULAR PERIODS: ICD-10-CM

## 2025-07-03 LAB — B-HCG SERPL-ACNC: <2.6 MIU/ML (ref ?–4.2)

## 2025-07-03 PROCEDURE — 99214 OFFICE O/P EST MOD 30 MIN: CPT | Performed by: INTERNAL MEDICINE

## 2025-07-03 PROCEDURE — 84702 CHORIONIC GONADOTROPIN TEST: CPT

## 2025-07-03 PROCEDURE — 36415 COLL VENOUS BLD VENIPUNCTURE: CPT

## 2025-07-03 NOTE — PROGRESS NOTES
Subjective:     Patient ID: Carisa Khan is a 30 year old female.    HPI  She came in today for follow-up.  According to her she does have a history of PCOS and while she was on metformin her.  Was regular recently since May her period started to be irregular again she did not get her.  Since May.  The only new thing that happened since May is that she started exercising.  She has not been seen by OB for a long time.  She never saw endocrinologist.  She is taking her metformin regularly.  History/Other:   Review of Systems   Constitutional: Negative.    HENT: Negative.     Eyes: Negative.    Respiratory: Negative.     Cardiovascular: Negative.    Gastrointestinal: Negative.    Endocrine: Negative.    Genitourinary: Negative.    Musculoskeletal: Negative.    Skin: Negative.    Allergic/Immunologic: Negative.    Neurological: Negative.    Hematological: Negative.    Psychiatric/Behavioral: Negative.       Current Medications[1]  Allergies:Allergies[2]    Past Medical History[3]   Past Surgical History[4]   Family History[5]   Social History: Short Social Hx on File[6]     Objective:   Physical Exam  Vitals and nursing note reviewed.   Constitutional:       Appearance: Normal appearance.   HENT:      Head: Normocephalic and atraumatic.   Cardiovascular:      Rate and Rhythm: Normal rate and regular rhythm.      Pulses: Normal pulses.      Heart sounds: Normal heart sounds.   Pulmonary:      Effort: Pulmonary effort is normal.      Breath sounds: Normal breath sounds.   Musculoskeletal:         General: Normal range of motion.      Cervical back: Normal range of motion and neck supple.   Skin:     General: Skin is warm and dry.   Neurological:      Mental Status: She is alert.           Assessment & Plan:   No diagnosis found.  PCOS-I will send referral for endocrinology and OB continue with metformin for now    Missed period - pregnancy test  No orders of the defined types were placed in this encounter.      Meds  This Visit:  Requested Prescriptions      No prescriptions requested or ordered in this encounter       Imaging & Referrals:  None            [1]   Current Outpatient Medications   Medication Sig Dispense Refill    metFORMIN  MG Oral Tablet 24 Hr Take 1 tablet (500 mg total) by mouth daily. 90 tablet 0    cholecalciferol 50 MCG (2000 UT) Oral Tab Take 1 tablet (2,000 Units total) by mouth daily.     [2]   Allergies  Allergen Reactions    Latex HIVES    Bananas ITCHING   [3]   Past Medical History:   Diverticulosis    Gastritis    Hemorrhoid    Hypothyroidism    Takes vitamin d daily    PCOS (polycystic ovarian syndrome)   [4]   Past Surgical History:  Procedure Laterality Date    Colonoscopy N/A 9/17/2024    Procedure: COLONOSCOPY;  Surgeon: Cortez Gay MD;  Location: [facility]   [5]   Family History  Problem Relation Age of Onset    Cancer Mother     Diabetes Mother     Cancer Father     Cancer Paternal Grandmother    [6]   Social History  Socioeconomic History    Marital status:    Tobacco Use    Smoking status: Never    Smokeless tobacco: Never   Vaping Use    Vaping status: Never Used   Substance and Sexual Activity    Alcohol use: Not Currently     Comment: occ    Drug use: Never    Sexual activity: Yes     Partners: Male     Social Drivers of Health     Food Insecurity: No Food Insecurity (1/10/2025)    NCSS - Food Insecurity     Worried About Running Out of Food in the Last Year: No     Ran Out of Food in the Last Year: No   Transportation Needs: No Transportation Needs (1/10/2025)    NCSS - Transportation     Lack of Transportation: No   Stress: Not on File (11/12/2024)    Received from [facility]    Stress     Stress: 0   Housing Stability: Not At Risk (1/10/2025)    NCSS - Housing/Utilities     Has Housing: Yes     Worried About Losing Housing: No     Unable to Get Utilities: No

## 2025-07-09 RX ORDER — METFORMIN HYDROCHLORIDE 500 MG/1
500 TABLET, EXTENDED RELEASE ORAL DAILY
Qty: 90 TABLET | Refills: 0 | Status: SHIPPED | OUTPATIENT
Start: 2025-07-09

## 2025-07-31 ENCOUNTER — TELEPHONE (OUTPATIENT)
Dept: OBGYN CLINIC | Facility: CLINIC | Age: 30
End: 2025-07-31

## 2025-07-31 DIAGNOSIS — N91.2 AMENORRHEA: Primary | ICD-10-CM

## 2025-08-05 ENCOUNTER — OFFICE VISIT (OUTPATIENT)
Dept: OBGYN CLINIC | Facility: CLINIC | Age: 30
End: 2025-08-05

## 2025-08-05 VITALS
HEIGHT: 64 IN | HEART RATE: 57 BPM | SYSTOLIC BLOOD PRESSURE: 112 MMHG | BODY MASS INDEX: 31.97 KG/M2 | DIASTOLIC BLOOD PRESSURE: 76 MMHG | WEIGHT: 187.25 LBS

## 2025-08-05 DIAGNOSIS — N91.2 AMENORRHEA: ICD-10-CM

## 2025-08-05 DIAGNOSIS — N96 HISTORY OF RECURRENT MISCARRIAGES: Primary | ICD-10-CM

## 2025-08-05 PROBLEM — D25.9 UTERINE FIBROID: Status: ACTIVE | Noted: 2025-08-05

## 2025-08-05 PROCEDURE — 76856 US EXAM PELVIC COMPLETE: CPT | Performed by: OBSTETRICS & GYNECOLOGY

## 2025-08-05 PROCEDURE — 99213 OFFICE O/P EST LOW 20 MIN: CPT | Performed by: OBSTETRICS & GYNECOLOGY

## 2025-08-08 ENCOUNTER — LAB ENCOUNTER (OUTPATIENT)
Dept: LAB | Facility: HOSPITAL | Age: 30
End: 2025-08-08
Attending: OBSTETRICS & GYNECOLOGY

## 2025-08-08 DIAGNOSIS — N96 HISTORY OF RECURRENT MISCARRIAGES: ICD-10-CM

## 2025-08-08 LAB — TSI SER-ACNC: 3.15 UIU/ML (ref 0.55–4.78)

## 2025-08-08 PROCEDURE — 85610 PROTHROMBIN TIME: CPT

## 2025-08-08 PROCEDURE — 85730 THROMBOPLASTIN TIME PARTIAL: CPT

## 2025-08-08 PROCEDURE — 86146 BETA-2 GLYCOPROTEIN ANTIBODY: CPT

## 2025-08-08 PROCEDURE — 88262 CHROMOSOME ANALYSIS 15-20: CPT

## 2025-08-08 PROCEDURE — 88237 TISSUE CULTURE BONE MARROW: CPT

## 2025-08-08 PROCEDURE — 84443 ASSAY THYROID STIM HORMONE: CPT

## 2025-08-08 PROCEDURE — 85613 RUSSELL VIPER VENOM DILUTED: CPT

## 2025-08-08 PROCEDURE — 36415 COLL VENOUS BLD VENIPUNCTURE: CPT

## 2025-08-08 PROCEDURE — 85598 HEXAGNAL PHOSPH PLTLT NEUTRL: CPT

## 2025-08-08 PROCEDURE — 86147 CARDIOLIPIN ANTIBODY EA IG: CPT

## 2025-08-08 PROCEDURE — 85390 FIBRINOLYSINS SCREEN I&R: CPT

## 2025-08-11 LAB
B2 GLYCOPROT1 IGG SERPL IA-ACNC: 1.3 U/ML (ref ?–7)
B2 GLYCOPROT1 IGM SERPL IA-ACNC: <2.4 U/ML (ref ?–7)
CARDIOLIPIN IGG SERPL-ACNC: 2 GPL (ref ?–10)
CARDIOLIPIN IGM SERPL-ACNC: 1.4 MPL (ref ?–10)

## 2025-08-12 LAB
APTT PPP: 30.9 SECONDS (ref 23–36)
CONFIRM APTT STACLOT: NEGATIVE
CONFIRM DRVVT: 1 (ref 0–1.1)
PROTHROMBIN TIME: 13.4 SECONDS (ref 11.6–14.8)

## 2025-08-21 LAB
CELLS ANALYZED BLDCHR: 20
CELLS COUNTED BLDCHR: 20
CELLS KARYOTYPED BLDCHR: 2
GTG BAND BLDCHR: 500

## (undated) DEVICE — YANKAUER,BULB TIP,W/O VENT,RIGID,STERILE: Brand: MEDLINE

## (undated) DEVICE — KIT CLEAN ENDOKIT 1.1OZ GOWNX2

## (undated) DEVICE — KIT MFLD FOR SPEC COLL

## (undated) DEVICE — CONMED SCOPE SAVER BITE BLOCK, 20X27 MM: Brand: SCOPE SAVER

## (undated) DEVICE — CO2 CANNULA,SSOFT,ADLT,7O2,4CO2,FEMALE: Brand: MEDLINE

## (undated) DEVICE — GIJAW SINGLE-USE BIOPSY FORCEPS WITH NEEDLE: Brand: GIJAW

## (undated) DEVICE — MEDI-VAC NON-CONDUCTIVE SUCTION TUBING 6MM X 1.8M (6FT.) L: Brand: CARDINAL HEALTH

## (undated) DEVICE — SYRINGE, LUER SLIP, STERILE, 60ML: Brand: MEDLINE

## (undated) DEVICE — MEDI-VAC NON-CONDUCTIVE SUCTION TUBING: Brand: CARDINAL HEALTH

## (undated) DEVICE — KIT ENDO ORCAPOD 160/180/190

## (undated) NOTE — LETTER
[facility]  [address], [place], [place]    Authorization for Surgical Operation and Procedure                               I hereby authorize Cortez Gay MD, my physician and his/her assistants (if applicable), which may include medical students, residents, and/or fellows, to perform the following surgical operation/ procedure and administer such anesthesia as may be determined necessary by my physician: Operation/Procedure name (s) COLONOSCOPY / ESOPHAGOGASTRODUODENOSCOPY on Carisa Khan   2.   I recognize that during the surgical operation/procedure, unforeseen conditions may necessitate additional or different procedures than those listed above.  I, therefore, further authorize and request that the above-named surgeon, assistants, or designees perform such procedures as are, in their judgment, necessary and desirable.    3.   My surgeon/physician has discussed prior to my surgery the potential benefits, risks and side effects of this procedure; the likelihood of achieving goals; and potential problems that might occur during recuperation.  They also discussed reasonable alternatives to the procedure, including risks, benefits, and side effects related to the alternatives and risks related to not receiving this procedure.  I have had all my questions answered and I acknowledge that no guarantee has been made as to the result that may be obtained.    4.   Should the need arise during my operation/procedure, which includes change of level of care prior to discharge, I also consent to the administration of blood and/or blood products.  Further, I understand that despite careful testing and screening of blood or blood products by collecting agencies, I may still be subject to ill effects as a result of receiving a blood transfusion and/or blood products.  The following are some, but not all, of the potential risks that can occur: fever and allergic reactions, hemolytic reactions,  transmission of diseases such as Hepatitis, AIDS and Cytomegalovirus (CMV) and fluid overload.  In the event that I wish to have an autologous transfusion of my own blood, or a directed donor transfusion, I will discuss this with my physician.  Check only if Refusing Blood or Blood Products  I understand refusal of blood or blood products as deemed necessary by my physician may have serious consequences to my condition to include possible death. I hereby assume responsibility for my refusal and release the hospital, its personnel, and my physicians from any responsibility for the consequences of my refusal.    o  Refuse   5.   I authorize the use of any specimen, organs, tissues, body parts or foreign objects that may be removed from my body during the operation/procedure for diagnosis, research or teaching purposes and their subsequent disposal by hospital authorities.  I also authorize the release of specimen test results and/or written reports to my treating physician on the hospital medical staff or other referring or consulting physicians involved in my care, at the discretion of the Pathologist or my treating physician.    6.   I consent to the photographing or videotaping of the operations or procedures to be performed, including appropriate portions of my body for medical, scientific, or educational purposes, provided my identity is not revealed by the pictures or by descriptive texts accompanying them.  If the procedure has been photographed/videotaped, the surgeon will obtain the original picture, image, videotape or CD.  The hospital will not be responsible for storage, release or maintenance of the picture, image, tape or CD.    7.   I consent to the presence of a  or observers in the operating room as deemed necessary by my physician or their designees.    8.   I recognize that in the event my procedure results in extended X-Ray/fluoroscopy time, I may develop a skin reaction.    9. If  I have a Do Not Attempt Resuscitation (DNAR) order in place, that status will be suspended while in the operating room, procedural suite, and during the recovery period unless otherwise explicitly stated by me (or a person authorized to consent on my behalf). The surgeon or my attending physician will determine when the applicable recovery period ends for purposes of reinstating the DNAR order.  10. Patients having a sterilization procedure: I understand that if the procedure is successful the results will be permanent and it will therefore be impossible for me to inseminate, conceive, or bear children.  I also understand that the procedure is intended to result in sterility, although the result has not been guaranteed.   11. I acknowledge that my physician has explained sedation/analgesia administration to me including the risk and benefits I consent to the administration of sedation/analgesia as may be necessary or desirable in the judgment of my physician.    I CERTIFY THAT I HAVE READ AND FULLY UNDERSTAND THE ABOVE CONSENT TO OPERATION and/or OTHER PROCEDURE.     ____________________________________  _________________________________        ______________________________  Signature of Patient    Signature of Responsible Person                Printed Name of Responsible Person                                      ____________________________________  _____________________________                ________________________________  Signature of Witness        Date  Time         Relationship to Patient    STATEMENT OF PHYSICIAN My signature below affirms that prior to the time of the procedure; I have explained to the patient and/or his/her legal representative, the risks and benefits involved in the proposed treatment and any reasonable alternative to the proposed treatment. I have also explained the risks and benefits involved in refusal of the proposed treatment and alternatives to the proposed treatment and have  answered the patient's questions. If I have a significant financial interest in a co-management agreement or a significant financial interest in any product or implant, or other significant relationship used in this procedure/surgery, I have disclosed this and had a discussion with my patient.     _____________________________________________________              _____________________________  (Signature of Physician)                                                                                         (Date)                                   (Time)  Patient Name: Carisa Khan      : 1995      Printed: 2024     Medical Record #: [id number]                                      Page 1 of 1